# Patient Record
Sex: MALE | Race: WHITE | NOT HISPANIC OR LATINO | Employment: OTHER | URBAN - NONMETROPOLITAN AREA
[De-identification: names, ages, dates, MRNs, and addresses within clinical notes are randomized per-mention and may not be internally consistent; named-entity substitution may affect disease eponyms.]

---

## 2017-01-01 ENCOUNTER — HOSPITAL ENCOUNTER (OUTPATIENT)
Facility: HOSPITAL | Age: 72
Setting detail: HOSPITAL OUTPATIENT SURGERY
Discharge: HOME | End: 2017-12-05
Attending: SURGERY | Admitting: SURGERY

## 2017-01-01 ENCOUNTER — HOSPITAL ENCOUNTER (OUTPATIENT)
Facility: HOSPITAL | Age: 72
Setting detail: HOSPITAL OUTPATIENT SURGERY
Discharge: HOME | End: 2017-07-07
Attending: UROLOGY | Admitting: UROLOGY

## 2017-01-01 ENCOUNTER — LAB (OUTPATIENT)
Dept: LAB | Facility: OTHER | Age: 72
End: 2017-01-01

## 2017-01-01 ENCOUNTER — CLINICAL SUPPORT (OUTPATIENT)
Dept: FAMILY MEDICINE CLINIC | Facility: CLINIC | Age: 72
End: 2017-01-01

## 2017-01-01 ENCOUNTER — ANESTHESIA EVENT (OUTPATIENT)
Dept: PERIOP | Facility: HOSPITAL | Age: 72
End: 2017-01-01

## 2017-01-01 ENCOUNTER — PREP FOR SURGERY (OUTPATIENT)
Dept: OTHER | Facility: HOSPITAL | Age: 72
End: 2017-01-01

## 2017-01-01 ENCOUNTER — TELEPHONE (OUTPATIENT)
Dept: FAMILY MEDICINE CLINIC | Facility: CLINIC | Age: 72
End: 2017-01-01

## 2017-01-01 ENCOUNTER — APPOINTMENT (OUTPATIENT)
Dept: PREADMISSION TESTING | Facility: HOSPITAL | Age: 72
End: 2017-01-01

## 2017-01-01 ENCOUNTER — ANESTHESIA (OUTPATIENT)
Dept: PERIOP | Facility: HOSPITAL | Age: 72
End: 2017-01-01

## 2017-01-01 ENCOUNTER — ANESTHESIA (OUTPATIENT)
Dept: GASTROENTEROLOGY | Facility: HOSPITAL | Age: 72
End: 2017-01-01

## 2017-01-01 ENCOUNTER — HOSPITAL ENCOUNTER (OUTPATIENT)
Facility: HOSPITAL | Age: 72
Setting detail: HOSPITAL OUTPATIENT SURGERY
Discharge: HOME | End: 2017-09-15
Attending: UROLOGY | Admitting: UROLOGY

## 2017-01-01 ENCOUNTER — OFFICE VISIT (OUTPATIENT)
Dept: FAMILY MEDICINE CLINIC | Facility: CLINIC | Age: 72
End: 2017-01-01

## 2017-01-01 ENCOUNTER — OFFICE VISIT (OUTPATIENT)
Dept: SURGERY | Facility: CLINIC | Age: 72
End: 2017-01-01

## 2017-01-01 ENCOUNTER — APPOINTMENT (OUTPATIENT)
Dept: GENERAL RADIOLOGY | Facility: HOSPITAL | Age: 72
End: 2017-01-01

## 2017-01-01 ENCOUNTER — ANESTHESIA EVENT (OUTPATIENT)
Dept: GASTROENTEROLOGY | Facility: HOSPITAL | Age: 72
End: 2017-01-01

## 2017-01-01 ENCOUNTER — HOSPITAL ENCOUNTER (OUTPATIENT)
Dept: GENERAL RADIOLOGY | Facility: HOSPITAL | Age: 72
Discharge: HOME | End: 2017-06-30

## 2017-01-01 ENCOUNTER — HOSPITAL ENCOUNTER (OUTPATIENT)
Facility: HOSPITAL | Age: 72
Setting detail: HOSPITAL OUTPATIENT SURGERY
Discharge: HOME | End: 2017-06-30
Attending: UROLOGY | Admitting: UROLOGY

## 2017-01-01 VITALS — WEIGHT: 210 LBS | BODY MASS INDEX: 28.44 KG/M2 | HEIGHT: 72 IN

## 2017-01-01 VITALS
SYSTOLIC BLOOD PRESSURE: 132 MMHG | RESPIRATION RATE: 18 BRPM | OXYGEN SATURATION: 97 % | TEMPERATURE: 97.1 F | WEIGHT: 209 LBS | BODY MASS INDEX: 28.31 KG/M2 | HEIGHT: 72 IN | HEART RATE: 57 BPM | DIASTOLIC BLOOD PRESSURE: 70 MMHG

## 2017-01-01 VITALS
SYSTOLIC BLOOD PRESSURE: 114 MMHG | OXYGEN SATURATION: 91 % | WEIGHT: 208 LBS | DIASTOLIC BLOOD PRESSURE: 62 MMHG | BODY MASS INDEX: 28.17 KG/M2 | HEART RATE: 78 BPM | HEIGHT: 72 IN

## 2017-01-01 VITALS
SYSTOLIC BLOOD PRESSURE: 114 MMHG | OXYGEN SATURATION: 97 % | RESPIRATION RATE: 16 BRPM | DIASTOLIC BLOOD PRESSURE: 66 MMHG | HEART RATE: 69 BPM | WEIGHT: 210 LBS | HEIGHT: 72 IN | BODY MASS INDEX: 28.44 KG/M2

## 2017-01-01 VITALS
DIASTOLIC BLOOD PRESSURE: 78 MMHG | HEIGHT: 72 IN | BODY MASS INDEX: 27.9 KG/M2 | SYSTOLIC BLOOD PRESSURE: 112 MMHG | WEIGHT: 206 LBS

## 2017-01-01 VITALS
DIASTOLIC BLOOD PRESSURE: 66 MMHG | BODY MASS INDEX: 28.22 KG/M2 | WEIGHT: 208.34 LBS | SYSTOLIC BLOOD PRESSURE: 118 MMHG | HEART RATE: 65 BPM | TEMPERATURE: 96.7 F | OXYGEN SATURATION: 95 % | HEIGHT: 72 IN | RESPIRATION RATE: 18 BRPM

## 2017-01-01 VITALS
OXYGEN SATURATION: 92 % | HEIGHT: 72 IN | TEMPERATURE: 97.2 F | HEART RATE: 57 BPM | WEIGHT: 205.69 LBS | DIASTOLIC BLOOD PRESSURE: 65 MMHG | RESPIRATION RATE: 18 BRPM | BODY MASS INDEX: 27.86 KG/M2 | SYSTOLIC BLOOD PRESSURE: 136 MMHG

## 2017-01-01 VITALS
BODY MASS INDEX: 27.9 KG/M2 | DIASTOLIC BLOOD PRESSURE: 67 MMHG | HEIGHT: 72 IN | TEMPERATURE: 96.4 F | RESPIRATION RATE: 18 BRPM | SYSTOLIC BLOOD PRESSURE: 118 MMHG | OXYGEN SATURATION: 98 % | WEIGHT: 206 LBS | HEART RATE: 55 BPM

## 2017-01-01 VITALS
DIASTOLIC BLOOD PRESSURE: 70 MMHG | HEIGHT: 72 IN | SYSTOLIC BLOOD PRESSURE: 132 MMHG | BODY MASS INDEX: 28.44 KG/M2 | HEART RATE: 74 BPM | WEIGHT: 210 LBS | RESPIRATION RATE: 18 BRPM | TEMPERATURE: 98.3 F | OXYGEN SATURATION: 97 %

## 2017-01-01 VITALS
BODY MASS INDEX: 27.36 KG/M2 | SYSTOLIC BLOOD PRESSURE: 114 MMHG | HEIGHT: 72 IN | DIASTOLIC BLOOD PRESSURE: 70 MMHG | WEIGHT: 202 LBS

## 2017-01-01 DIAGNOSIS — I10 ESSENTIAL HYPERTENSION: Primary | ICD-10-CM

## 2017-01-01 DIAGNOSIS — N20.0 CALCULUS OF KIDNEY: ICD-10-CM

## 2017-01-01 DIAGNOSIS — E53.8 B12 DEFICIENCY: ICD-10-CM

## 2017-01-01 DIAGNOSIS — D51.0 PERNICIOUS ANEMIA: Primary | ICD-10-CM

## 2017-01-01 DIAGNOSIS — R63.4 UNEXPLAINED WEIGHT LOSS: ICD-10-CM

## 2017-01-01 DIAGNOSIS — I10 BENIGN ESSENTIAL HTN: ICD-10-CM

## 2017-01-01 DIAGNOSIS — N20.0 CALCULUS OF KIDNEY: Primary | ICD-10-CM

## 2017-01-01 DIAGNOSIS — J44.1 CHRONIC OBSTRUCTIVE PULMONARY DISEASE WITH ACUTE EXACERBATION (HCC): ICD-10-CM

## 2017-01-01 DIAGNOSIS — R31.9 HEMATURIA: Primary | ICD-10-CM

## 2017-01-01 DIAGNOSIS — Z23 NEED FOR INFLUENZA VACCINATION: Primary | ICD-10-CM

## 2017-01-01 DIAGNOSIS — H10.13 ALLERGIC CONJUNCTIVITIS, BILATERAL: ICD-10-CM

## 2017-01-01 DIAGNOSIS — R31.9 HEMATURIA: ICD-10-CM

## 2017-01-01 DIAGNOSIS — R05.9 COUGH: ICD-10-CM

## 2017-01-01 DIAGNOSIS — R91.8 BILATERAL PULMONARY INFILTRATES ON CXR: ICD-10-CM

## 2017-01-01 DIAGNOSIS — J01.01 ACUTE RECURRENT MAXILLARY SINUSITIS: Primary | ICD-10-CM

## 2017-01-01 DIAGNOSIS — N20.1 CALCULUS OF URETER: Primary | ICD-10-CM

## 2017-01-01 DIAGNOSIS — E78.5 HYPERLIPIDEMIA, UNSPECIFIED HYPERLIPIDEMIA TYPE: ICD-10-CM

## 2017-01-01 DIAGNOSIS — J84.10 FIBROSIS OF LUNG (HCC): Primary | ICD-10-CM

## 2017-01-01 DIAGNOSIS — R73.9 HYPERGLYCEMIA: Primary | ICD-10-CM

## 2017-01-01 DIAGNOSIS — N20.1 CALCULI, URETER: ICD-10-CM

## 2017-01-01 DIAGNOSIS — D51.0 PERNICIOUS ANEMIA: ICD-10-CM

## 2017-01-01 DIAGNOSIS — D12.3 ADENOMATOUS POLYP OF TRANSVERSE COLON: Primary | ICD-10-CM

## 2017-01-01 DIAGNOSIS — J84.10 FIBROSIS OF LUNG (HCC): ICD-10-CM

## 2017-01-01 DIAGNOSIS — H66.92 LEFT OTITIS MEDIA, UNSPECIFIED CHRONICITY, UNSPECIFIED OTITIS MEDIA TYPE: ICD-10-CM

## 2017-01-01 DIAGNOSIS — K63.5 DYSPLASTIC COLON POLYP: ICD-10-CM

## 2017-01-01 DIAGNOSIS — J30.1 SEASONAL ALLERGIC RHINITIS DUE TO POLLEN: ICD-10-CM

## 2017-01-01 DIAGNOSIS — K63.5 DYSPLASTIC COLON POLYP: Primary | ICD-10-CM

## 2017-01-01 DIAGNOSIS — N20.0 KIDNEY STONE: Primary | ICD-10-CM

## 2017-01-01 DIAGNOSIS — I10 ESSENTIAL HYPERTENSION: ICD-10-CM

## 2017-01-01 DIAGNOSIS — Z12.5 SCREENING PSA (PROSTATE SPECIFIC ANTIGEN): ICD-10-CM

## 2017-01-01 DIAGNOSIS — N20.1 CALCULI, URETER: Primary | ICD-10-CM

## 2017-01-01 DIAGNOSIS — R05.9 COUGH: Primary | ICD-10-CM

## 2017-01-01 DIAGNOSIS — R73.9 HYPERGLYCEMIA: ICD-10-CM

## 2017-01-01 DIAGNOSIS — E04.1 THYROID NODULE: ICD-10-CM

## 2017-01-01 LAB
ALBUMIN SERPL-MCNC: 3.8 G/DL (ref 3.2–5.5)
ALBUMIN/GLOB SERPL: 1 G/DL (ref 1–3)
ALP SERPL-CCNC: 86 U/L (ref 15–121)
ALT SERPL W P-5'-P-CCNC: 15 U/L (ref 10–60)
ANION GAP SERPL CALCULATED.3IONS-SCNC: 11 MMOL/L (ref 5–15)
AST SERPL-CCNC: 20 U/L (ref 10–60)
BACTERIA SPEC AEROBE CULT: NORMAL
BACTERIA UR QL AUTO: ABNORMAL /HPF
BASOPHILS # BLD AUTO: 0.03 10*3/MM3 (ref 0–0.2)
BASOPHILS # BLD AUTO: 0.05 10*3/MM3 (ref 0–0.2)
BASOPHILS NFR BLD AUTO: 0.4 % (ref 0–2)
BASOPHILS NFR BLD AUTO: 0.7 % (ref 0–2)
BILIRUB SERPL-MCNC: 1 MG/DL (ref 0.2–1)
BILIRUB UR QL STRIP: ABNORMAL
BILIRUB UR QL STRIP: ABNORMAL
BILIRUB UR QL STRIP: NEGATIVE
BUN BLD-MCNC: 15 MG/DL (ref 8–25)
BUN/CREAT SERPL: 13.6 (ref 7–25)
CALCIUM SPEC-SCNC: 9 MG/DL (ref 8.4–10.8)
CHLORIDE SERPL-SCNC: 103 MMOL/L (ref 100–112)
CHOLEST SERPL-MCNC: 158 MG/DL (ref 150–200)
CLARITY UR: ABNORMAL
CLARITY UR: ABNORMAL
CLARITY UR: CLEAR
CO2 SERPL-SCNC: 26 MMOL/L (ref 20–32)
COLOR UR: ABNORMAL
COLOR UR: ABNORMAL
COLOR UR: YELLOW
CREAT BLD-MCNC: 1.1 MG/DL (ref 0.4–1.3)
DEPRECATED RDW RBC AUTO: 43.8 FL (ref 35.1–43.9)
DEPRECATED RDW RBC AUTO: 45.9 FL (ref 35.1–43.9)
EOSINOPHIL # BLD AUTO: 0.14 10*3/MM3 (ref 0–0.7)
EOSINOPHIL # BLD AUTO: 0.18 10*3/MM3 (ref 0–0.7)
EOSINOPHIL NFR BLD AUTO: 2 % (ref 0–7)
EOSINOPHIL NFR BLD AUTO: 2.6 % (ref 0–7)
ERYTHROCYTE [DISTWIDTH] IN BLOOD BY AUTOMATED COUNT: 12.9 % (ref 11.5–14.5)
ERYTHROCYTE [DISTWIDTH] IN BLOOD BY AUTOMATED COUNT: 13.7 % (ref 11.5–14.5)
GFR SERPL CREATININE-BSD FRML MDRD: 66 ML/MIN/1.73 (ref 42–98)
GLOBULIN UR ELPH-MCNC: 3.7 GM/DL (ref 2.5–4.6)
GLUCOSE BLD-MCNC: 130 MG/DL (ref 70–100)
GLUCOSE UR STRIP-MCNC: NEGATIVE MG/DL
HCT VFR BLD AUTO: 39.7 % (ref 39–49)
HCT VFR BLD AUTO: 40.8 % (ref 39–49)
HDLC SERPL-MCNC: 37 MG/DL (ref 35–100)
HGB BLD-MCNC: 13.4 G/DL (ref 13.7–17.3)
HGB BLD-MCNC: 13.7 G/DL (ref 13.7–17.3)
HGB UR QL STRIP.AUTO: ABNORMAL
HGB UR QL STRIP.AUTO: ABNORMAL
HGB UR QL STRIP.AUTO: NEGATIVE
HYALINE CASTS UR QL AUTO: ABNORMAL /LPF
KETONES UR QL STRIP: ABNORMAL
KETONES UR QL STRIP: NEGATIVE
KETONES UR QL STRIP: NEGATIVE
LAB AP CASE REPORT: NORMAL
LAB AP CASE REPORT: NORMAL
LDLC SERPL CALC-MCNC: 94 MG/DL
LDLC/HDLC SERPL: 2.54 {RATIO}
LEUKOCYTE ESTERASE UR QL STRIP.AUTO: ABNORMAL
LEUKOCYTE ESTERASE UR QL STRIP.AUTO: NEGATIVE
LEUKOCYTE ESTERASE UR QL STRIP.AUTO: NEGATIVE
LYMPHOCYTES # BLD AUTO: 1.2 10*3/MM3 (ref 0.6–4.2)
LYMPHOCYTES # BLD AUTO: 1.33 10*3/MM3 (ref 0.6–4.2)
LYMPHOCYTES NFR BLD AUTO: 17.1 % (ref 10–50)
LYMPHOCYTES NFR BLD AUTO: 19.3 % (ref 10–50)
Lab: NORMAL
Lab: NORMAL
MCH RBC QN AUTO: 32.2 PG (ref 26.5–34)
MCH RBC QN AUTO: 32.3 PG (ref 26.5–34)
MCHC RBC AUTO-ENTMCNC: 33.6 G/DL (ref 31.5–36.3)
MCHC RBC AUTO-ENTMCNC: 33.8 G/DL (ref 31.5–36.3)
MCV RBC AUTO: 95.7 FL (ref 80–98)
MCV RBC AUTO: 96 FL (ref 80–98)
MONOCYTES # BLD AUTO: 0.61 10*3/MM3 (ref 0–0.9)
MONOCYTES # BLD AUTO: 0.63 10*3/MM3 (ref 0–0.9)
MONOCYTES NFR BLD AUTO: 8.7 % (ref 0–12)
MONOCYTES NFR BLD AUTO: 9.1 % (ref 0–12)
NEUTROPHILS # BLD AUTO: 4.74 10*3/MM3 (ref 2–8.6)
NEUTROPHILS # BLD AUTO: 5 10*3/MM3 (ref 2–8.6)
NEUTROPHILS NFR BLD AUTO: 68.9 % (ref 37–80)
NEUTROPHILS NFR BLD AUTO: 71.2 % (ref 37–80)
NITRITE UR QL STRIP: NEGATIVE
NITRITE UR QL STRIP: POSITIVE
NITRITE UR QL STRIP: POSITIVE
PATH REPORT.FINAL DX SPEC: NORMAL
PATH REPORT.FINAL DX SPEC: NORMAL
PATH REPORT.GROSS SPEC: NORMAL
PATH REPORT.GROSS SPEC: NORMAL
PH UR STRIP.AUTO: 5.5 [PH] (ref 5–9)
PH UR STRIP.AUTO: 6 [PH] (ref 5–9)
PH UR STRIP.AUTO: 7 [PH] (ref 5.5–8)
PLATELET # BLD AUTO: 215 10*3/MM3 (ref 150–450)
PLATELET # BLD AUTO: 217 10*3/MM3 (ref 150–450)
PMV BLD AUTO: 9.6 FL (ref 8–12)
PMV BLD AUTO: 9.8 FL (ref 8–12)
POTASSIUM BLD-SCNC: 3.8 MMOL/L (ref 3.4–5.4)
PROT SERPL-MCNC: 7.5 G/DL (ref 6.7–8.2)
PROT UR QL STRIP: ABNORMAL
PROT UR QL STRIP: ABNORMAL
PROT UR QL STRIP: NEGATIVE
RBC # BLD AUTO: 4.15 10*6/MM3 (ref 4.37–5.74)
RBC # BLD AUTO: 4.25 10*6/MM3 (ref 4.37–5.74)
RBC # UR: ABNORMAL /HPF
REF LAB TEST METHOD: ABNORMAL
SODIUM BLD-SCNC: 140 MMOL/L (ref 134–146)
SP GR UR STRIP: 1.02 (ref 1–1.03)
SQUAMOUS #/AREA URNS HPF: ABNORMAL /HPF
T4 FREE SERPL-MCNC: 1.68 NG/DL (ref 0.78–2.19)
TRIGL SERPL-MCNC: 135 MG/DL (ref 35–160)
TSH SERPL DL<=0.05 MIU/L-ACNC: 0.34 MIU/ML (ref 0.46–4.68)
UROBILINOGEN UR QL STRIP: ABNORMAL
UROBILINOGEN UR QL STRIP: ABNORMAL
UROBILINOGEN UR QL STRIP: NORMAL
VLDLC SERPL-MCNC: 27 MG/DL
WBC NRBC COR # BLD: 6.89 10*3/MM3 (ref 3.2–9.8)
WBC NRBC COR # BLD: 7.02 10*3/MM3 (ref 3.2–9.8)
WBC UR QL AUTO: ABNORMAL /HPF

## 2017-01-01 PROCEDURE — 96372 THER/PROPH/DIAG INJ SC/IM: CPT | Performed by: FAMILY MEDICINE

## 2017-01-01 PROCEDURE — C1769 GUIDE WIRE: HCPCS | Performed by: UROLOGY

## 2017-01-01 PROCEDURE — 25010000002 PROPOFOL 10 MG/ML EMULSION: Performed by: NURSE ANESTHETIST, CERTIFIED REGISTERED

## 2017-01-01 PROCEDURE — 74420 UROGRAPHY RTRGR +-KUB: CPT

## 2017-01-01 PROCEDURE — 25010000002 MIDAZOLAM PER 1 MG: Performed by: NURSE ANESTHETIST, CERTIFIED REGISTERED

## 2017-01-01 PROCEDURE — C2617 STENT, NON-COR, TEM W/O DEL: HCPCS | Performed by: UROLOGY

## 2017-01-01 PROCEDURE — C1887 CATHETER, GUIDING: HCPCS | Performed by: UROLOGY

## 2017-01-01 PROCEDURE — 25010000002 LEVOFLOXACIN PER 250 MG: Performed by: UROLOGY

## 2017-01-01 PROCEDURE — 25010000002 HYDROMORPHONE PER 4 MG: Performed by: ANESTHESIOLOGY

## 2017-01-01 PROCEDURE — 0 IOPAMIDOL 61 % SOLUTION: Performed by: UROLOGY

## 2017-01-01 PROCEDURE — 80053 COMPREHEN METABOLIC PANEL: CPT | Performed by: FAMILY MEDICINE

## 2017-01-01 PROCEDURE — 85025 COMPLETE CBC W/AUTO DIFF WBC: CPT | Performed by: FAMILY MEDICINE

## 2017-01-01 PROCEDURE — C1894 INTRO/SHEATH, NON-LASER: HCPCS | Performed by: UROLOGY

## 2017-01-01 PROCEDURE — 81003 URINALYSIS AUTO W/O SCOPE: CPT | Performed by: UROLOGY

## 2017-01-01 PROCEDURE — C1758 CATHETER, URETERAL: HCPCS | Performed by: UROLOGY

## 2017-01-01 PROCEDURE — 45380 COLONOSCOPY AND BIOPSY: CPT | Performed by: SURGERY

## 2017-01-01 PROCEDURE — 88300 SURGICAL PATH GROSS: CPT | Performed by: UROLOGY

## 2017-01-01 PROCEDURE — 93005 ELECTROCARDIOGRAM TRACING: CPT | Performed by: ANESTHESIOLOGY

## 2017-01-01 PROCEDURE — 99214 OFFICE O/P EST MOD 30 MIN: CPT | Performed by: NURSE PRACTITIONER

## 2017-01-01 PROCEDURE — 84443 ASSAY THYROID STIM HORMONE: CPT | Performed by: FAMILY MEDICINE

## 2017-01-01 PROCEDURE — 80061 LIPID PANEL: CPT | Performed by: FAMILY MEDICINE

## 2017-01-01 PROCEDURE — 99213 OFFICE O/P EST LOW 20 MIN: CPT | Performed by: SURGERY

## 2017-01-01 PROCEDURE — 88300 SURGICAL PATH GROSS: CPT | Performed by: PATHOLOGY

## 2017-01-01 PROCEDURE — 81001 URINALYSIS AUTO W/SCOPE: CPT | Performed by: FAMILY MEDICINE

## 2017-01-01 PROCEDURE — 94640 AIRWAY INHALATION TREATMENT: CPT

## 2017-01-01 PROCEDURE — 25010000002 ONDANSETRON PER 1 MG: Performed by: NURSE ANESTHETIST, CERTIFIED REGISTERED

## 2017-01-01 PROCEDURE — 88305 TISSUE EXAM BY PATHOLOGIST: CPT | Performed by: PATHOLOGY

## 2017-01-01 PROCEDURE — 90662 IIV NO PRSV INCREASED AG IM: CPT | Performed by: FAMILY MEDICINE

## 2017-01-01 PROCEDURE — 99212 OFFICE O/P EST SF 10 MIN: CPT | Performed by: SURGERY

## 2017-01-01 PROCEDURE — 87086 URINE CULTURE/COLONY COUNT: CPT | Performed by: FAMILY MEDICINE

## 2017-01-01 PROCEDURE — 84439 ASSAY OF FREE THYROXINE: CPT | Performed by: FAMILY MEDICINE

## 2017-01-01 PROCEDURE — 99214 OFFICE O/P EST MOD 30 MIN: CPT | Performed by: FAMILY MEDICINE

## 2017-01-01 PROCEDURE — 36415 COLL VENOUS BLD VENIPUNCTURE: CPT | Performed by: FAMILY MEDICINE

## 2017-01-01 PROCEDURE — G0008 ADMIN INFLUENZA VIRUS VAC: HCPCS | Performed by: FAMILY MEDICINE

## 2017-01-01 PROCEDURE — 93010 ELECTROCARDIOGRAM REPORT: CPT | Performed by: INTERNAL MEDICINE

## 2017-01-01 PROCEDURE — 25010000002 DEXAMETHASONE PER 1 MG: Performed by: NURSE ANESTHETIST, CERTIFIED REGISTERED

## 2017-01-01 PROCEDURE — 88305 TISSUE EXAM BY PATHOLOGIST: CPT | Performed by: SURGERY

## 2017-01-01 PROCEDURE — 25010000002 FENTANYL CITRATE (PF) 100 MCG/2ML SOLUTION: Performed by: NURSE ANESTHETIST, CERTIFIED REGISTERED

## 2017-01-01 PROCEDURE — 45385 COLONOSCOPY W/LESION REMOVAL: CPT | Performed by: SURGERY

## 2017-01-01 DEVICE — URETERAL STENT
Type: IMPLANTABLE DEVICE | Status: FUNCTIONAL
Brand: PERCUFLEX™ PLUS

## 2017-01-01 DEVICE — URETERAL STENT
Type: IMPLANTABLE DEVICE | Site: URETER | Status: FUNCTIONAL
Brand: POLARIS™ ULTRA

## 2017-01-01 DEVICE — URETERAL STENT
Type: IMPLANTABLE DEVICE | Site: URETER | Status: FUNCTIONAL
Brand: PERCUFLEX™ PLUS

## 2017-01-01 RX ORDER — EPHEDRINE SULFATE 50 MG/ML
5 INJECTION, SOLUTION INTRAVENOUS ONCE AS NEEDED
Status: DISCONTINUED | OUTPATIENT
Start: 2017-01-01 | End: 2017-01-01 | Stop reason: HOSPADM

## 2017-01-01 RX ORDER — OXYCODONE AND ACETAMINOPHEN 7.5; 325 MG/1; MG/1
1 TABLET ORAL ONCE AS NEEDED
Status: COMPLETED | OUTPATIENT
Start: 2017-01-01 | End: 2017-01-01

## 2017-01-01 RX ORDER — TAMSULOSIN HYDROCHLORIDE 0.4 MG/1
1 CAPSULE ORAL NIGHTLY
Qty: 90 CAPSULE | Refills: 1 | Status: SHIPPED | OUTPATIENT
Start: 2017-01-01

## 2017-01-01 RX ORDER — MIDAZOLAM HYDROCHLORIDE 1 MG/ML
INJECTION INTRAMUSCULAR; INTRAVENOUS AS NEEDED
Status: DISCONTINUED | OUTPATIENT
Start: 2017-01-01 | End: 2017-01-01 | Stop reason: SURG

## 2017-01-01 RX ORDER — DOXYCYCLINE HYCLATE 100 MG/1
100 CAPSULE ORAL DAILY
Qty: 7 CAPSULE | Refills: 0 | Status: SHIPPED | OUTPATIENT
Start: 2017-01-01 | End: 2017-01-01

## 2017-01-01 RX ORDER — ONDANSETRON 2 MG/ML
4 INJECTION INTRAMUSCULAR; INTRAVENOUS ONCE AS NEEDED
Status: DISCONTINUED | OUTPATIENT
Start: 2017-01-01 | End: 2017-01-01 | Stop reason: HOSPADM

## 2017-01-01 RX ORDER — OXYCODONE AND ACETAMINOPHEN 7.5; 325 MG/1; MG/1
1-2 TABLET ORAL EVERY 4 HOURS PRN
Qty: 15 TABLET | Refills: 0 | Status: SHIPPED | OUTPATIENT
Start: 2017-01-01 | End: 2017-01-01

## 2017-01-01 RX ORDER — CEPHALEXIN 250 MG/1
250 CAPSULE ORAL 4 TIMES DAILY
Qty: 10 CAPSULE | Refills: 0 | Status: SHIPPED | OUTPATIENT
Start: 2017-01-01 | End: 2017-01-01

## 2017-01-01 RX ORDER — FLUMAZENIL 0.1 MG/ML
0.2 INJECTION INTRAVENOUS AS NEEDED
Status: DISCONTINUED | OUTPATIENT
Start: 2017-01-01 | End: 2017-01-01 | Stop reason: HOSPADM

## 2017-01-01 RX ORDER — PROPOFOL 10 MG/ML
VIAL (ML) INTRAVENOUS AS NEEDED
Status: DISCONTINUED | OUTPATIENT
Start: 2017-01-01 | End: 2017-01-01 | Stop reason: SURG

## 2017-01-01 RX ORDER — ACETAMINOPHEN 650 MG/1
650 SUPPOSITORY RECTAL ONCE AS NEEDED
Status: DISCONTINUED | OUTPATIENT
Start: 2017-01-01 | End: 2017-01-01 | Stop reason: HOSPADM

## 2017-01-01 RX ORDER — OMEPRAZOLE 20 MG/1
40 CAPSULE, DELAYED RELEASE ORAL DAILY
COMMUNITY

## 2017-01-01 RX ORDER — FENTANYL CITRATE 50 UG/ML
INJECTION, SOLUTION INTRAMUSCULAR; INTRAVENOUS AS NEEDED
Status: DISCONTINUED | OUTPATIENT
Start: 2017-01-01 | End: 2017-01-01 | Stop reason: SURG

## 2017-01-01 RX ORDER — LEVOFLOXACIN 5 MG/ML
500 INJECTION, SOLUTION INTRAVENOUS ONCE
Status: COMPLETED | OUTPATIENT
Start: 2017-01-01 | End: 2017-01-01

## 2017-01-01 RX ORDER — OMEPRAZOLE 20 MG/1
CAPSULE, DELAYED RELEASE ORAL
Qty: 180 CAPSULE | Refills: 2 | Status: ON HOLD | OUTPATIENT
Start: 2017-01-01 | End: 2017-01-01 | Stop reason: SDUPTHER

## 2017-01-01 RX ORDER — TAMSULOSIN HYDROCHLORIDE 0.4 MG/1
1 CAPSULE ORAL NIGHTLY
COMMUNITY
End: 2017-01-01 | Stop reason: SDUPTHER

## 2017-01-01 RX ORDER — GUAIFENESIN 600 MG/1
1200 TABLET, EXTENDED RELEASE ORAL 2 TIMES DAILY
Qty: 10 TABLET | Refills: 0 | Status: ON HOLD | OUTPATIENT
Start: 2017-01-01 | End: 2017-01-01

## 2017-01-01 RX ORDER — ONDANSETRON 2 MG/ML
4 INJECTION INTRAMUSCULAR; INTRAVENOUS ONCE AS NEEDED
Status: COMPLETED | OUTPATIENT
Start: 2017-01-01 | End: 2017-01-01

## 2017-01-01 RX ORDER — SODIUM CHLORIDE, SODIUM GLUCONATE, SODIUM ACETATE, POTASSIUM CHLORIDE, AND MAGNESIUM CHLORIDE 526; 502; 368; 37; 30 MG/100ML; MG/100ML; MG/100ML; MG/100ML; MG/100ML
1000 INJECTION, SOLUTION INTRAVENOUS CONTINUOUS PRN
Status: CANCELLED | OUTPATIENT
Start: 2017-01-01

## 2017-01-01 RX ORDER — PROMETHAZINE HYDROCHLORIDE 25 MG/1
25 TABLET ORAL ONCE AS NEEDED
Status: DISCONTINUED | OUTPATIENT
Start: 2017-01-01 | End: 2017-01-01 | Stop reason: HOSPADM

## 2017-01-01 RX ORDER — LIDOCAINE HYDROCHLORIDE 20 MG/ML
INJECTION, SOLUTION INFILTRATION; PERINEURAL AS NEEDED
Status: DISCONTINUED | OUTPATIENT
Start: 2017-01-01 | End: 2017-01-01 | Stop reason: SURG

## 2017-01-01 RX ORDER — DOXYCYCLINE HYCLATE 100 MG/1
100 CAPSULE ORAL 2 TIMES DAILY
Qty: 14 CAPSULE | Refills: 0 | Status: SHIPPED | OUTPATIENT
Start: 2017-01-01 | End: 2017-01-01

## 2017-01-01 RX ORDER — ALBUTEROL SULFATE 2.5 MG/3ML
2.5 SOLUTION RESPIRATORY (INHALATION) EVERY 4 HOURS PRN
Qty: 360 ML | Refills: 12 | Status: SHIPPED | OUTPATIENT
Start: 2017-01-01

## 2017-01-01 RX ORDER — DIPHENHYDRAMINE HYDROCHLORIDE 50 MG/ML
12.5 INJECTION INTRAMUSCULAR; INTRAVENOUS
Status: DISCONTINUED | OUTPATIENT
Start: 2017-01-01 | End: 2017-01-01 | Stop reason: HOSPADM

## 2017-01-01 RX ORDER — GUAIFENESIN AND DEXTROMETHORPHAN HYDROBROMIDE 600; 30 MG/1; MG/1
1 TABLET, EXTENDED RELEASE ORAL 2 TIMES DAILY PRN
Qty: 30 TABLET | Refills: 5 | Status: SHIPPED | OUTPATIENT
Start: 2017-01-01 | End: 2017-01-01

## 2017-01-01 RX ORDER — FLUTICASONE PROPIONATE 50 MCG
2 SPRAY, SUSPENSION (ML) NASAL DAILY
Qty: 9.9 ML | Refills: 0 | Status: SHIPPED | OUTPATIENT
Start: 2017-01-01

## 2017-01-01 RX ORDER — METOPROLOL SUCCINATE 50 MG/1
TABLET, EXTENDED RELEASE ORAL
Qty: 90 TABLET | Refills: 1 | Status: SHIPPED | OUTPATIENT
Start: 2017-01-01 | End: 2017-01-01 | Stop reason: SDUPTHER

## 2017-01-01 RX ORDER — LABETALOL HYDROCHLORIDE 5 MG/ML
5 INJECTION, SOLUTION INTRAVENOUS
Status: DISCONTINUED | OUTPATIENT
Start: 2017-01-01 | End: 2017-01-01 | Stop reason: HOSPADM

## 2017-01-01 RX ORDER — DEXAMETHASONE SODIUM PHOSPHATE 4 MG/ML
INJECTION, SOLUTION INTRA-ARTICULAR; INTRALESIONAL; INTRAMUSCULAR; INTRAVENOUS; SOFT TISSUE AS NEEDED
Status: DISCONTINUED | OUTPATIENT
Start: 2017-01-01 | End: 2017-01-01 | Stop reason: SURG

## 2017-01-01 RX ORDER — SODIUM CHLORIDE, SODIUM GLUCONATE, SODIUM ACETATE, POTASSIUM CHLORIDE, AND MAGNESIUM CHLORIDE 526; 502; 368; 37; 30 MG/100ML; MG/100ML; MG/100ML; MG/100ML; MG/100ML
1000 INJECTION, SOLUTION INTRAVENOUS CONTINUOUS PRN
Status: DISCONTINUED | OUTPATIENT
Start: 2017-01-01 | End: 2017-01-01 | Stop reason: HOSPADM

## 2017-01-01 RX ORDER — LEVOFLOXACIN 5 MG/ML
500 INJECTION, SOLUTION INTRAVENOUS ONCE
Status: CANCELLED | OUTPATIENT
Start: 2017-01-01

## 2017-01-01 RX ORDER — DEXTROSE AND SODIUM CHLORIDE 5; .45 G/100ML; G/100ML
30 INJECTION, SOLUTION INTRAVENOUS CONTINUOUS PRN
Status: CANCELLED | OUTPATIENT
Start: 2017-01-01

## 2017-01-01 RX ORDER — DEXAMETHASONE SODIUM PHOSPHATE 4 MG/ML
8 INJECTION, SOLUTION INTRA-ARTICULAR; INTRALESIONAL; INTRAMUSCULAR; INTRAVENOUS; SOFT TISSUE ONCE AS NEEDED
Status: DISCONTINUED | OUTPATIENT
Start: 2017-01-01 | End: 2017-01-01 | Stop reason: HOSPADM

## 2017-01-01 RX ORDER — PROMETHAZINE HYDROCHLORIDE 25 MG/ML
12.5 INJECTION, SOLUTION INTRAMUSCULAR; INTRAVENOUS ONCE AS NEEDED
Status: DISCONTINUED | OUTPATIENT
Start: 2017-01-01 | End: 2017-01-01 | Stop reason: HOSPADM

## 2017-01-01 RX ORDER — ACETAMINOPHEN 325 MG/1
650 TABLET ORAL ONCE AS NEEDED
Status: DISCONTINUED | OUTPATIENT
Start: 2017-01-01 | End: 2017-01-01 | Stop reason: HOSPADM

## 2017-01-01 RX ORDER — LEVOFLOXACIN 5 MG/ML
500 INJECTION, SOLUTION INTRAVENOUS ONCE
Status: DISCONTINUED | OUTPATIENT
Start: 2017-01-01 | End: 2017-01-01 | Stop reason: HOSPADM

## 2017-01-01 RX ORDER — PROMETHAZINE HYDROCHLORIDE 25 MG/1
25 SUPPOSITORY RECTAL ONCE AS NEEDED
Status: DISCONTINUED | OUTPATIENT
Start: 2017-01-01 | End: 2017-01-01 | Stop reason: HOSPADM

## 2017-01-01 RX ORDER — LEVALBUTEROL INHALATION SOLUTION 1.25 MG/3ML
SOLUTION RESPIRATORY (INHALATION)
COMMUNITY
Start: 2017-01-01

## 2017-01-01 RX ORDER — OMEPRAZOLE 20 MG/1
CAPSULE, DELAYED RELEASE ORAL
Qty: 180 CAPSULE | Refills: 1 | Status: SHIPPED | OUTPATIENT
Start: 2017-01-01 | End: 2017-01-01 | Stop reason: SDUPTHER

## 2017-01-01 RX ORDER — AMLODIPINE BESYLATE 10 MG/1
10 TABLET ORAL DAILY
COMMUNITY

## 2017-01-01 RX ORDER — DEXTROSE AND SODIUM CHLORIDE 5; .45 G/100ML; G/100ML
30 INJECTION, SOLUTION INTRAVENOUS CONTINUOUS PRN
Status: DISCONTINUED | OUTPATIENT
Start: 2017-01-01 | End: 2017-01-01 | Stop reason: HOSPADM

## 2017-01-01 RX ORDER — AMOXICILLIN AND CLAVULANATE POTASSIUM 875; 125 MG/1; MG/1
1 TABLET, FILM COATED ORAL 2 TIMES DAILY
Qty: 20 TABLET | Refills: 0 | Status: CANCELLED | OUTPATIENT
Start: 2017-01-01 | End: 2017-01-01

## 2017-01-01 RX ORDER — METOPROLOL SUCCINATE 50 MG/1
TABLET, EXTENDED RELEASE ORAL
Qty: 90 TABLET | Refills: 2 | Status: ON HOLD | OUTPATIENT
Start: 2017-01-01 | End: 2017-01-01 | Stop reason: SDUPTHER

## 2017-01-01 RX ORDER — DEXTROMETHORPHAN HYDROBROMIDE AND PROMETHAZINE HYDROCHLORIDE 15; 6.25 MG/5ML; MG/5ML
5 SYRUP ORAL NIGHTLY PRN
Qty: 118 ML | Refills: 0 | Status: SHIPPED | OUTPATIENT
Start: 2017-01-01 | End: 2017-01-01

## 2017-01-01 RX ORDER — IPRATROPIUM BROMIDE 21 UG/1
1 SPRAY, METERED NASAL 2 TIMES DAILY
Status: ON HOLD | COMMUNITY
End: 2017-01-01

## 2017-01-01 RX ORDER — NALOXONE HCL 0.4 MG/ML
0.2 VIAL (ML) INJECTION AS NEEDED
Status: DISCONTINUED | OUTPATIENT
Start: 2017-01-01 | End: 2017-01-01 | Stop reason: HOSPADM

## 2017-01-01 RX ORDER — LIDOCAINE HYDROCHLORIDE 10 MG/ML
INJECTION, SOLUTION INFILTRATION; PERINEURAL AS NEEDED
Status: DISCONTINUED | OUTPATIENT
Start: 2017-01-01 | End: 2017-01-01 | Stop reason: SURG

## 2017-01-01 RX ORDER — CEFUROXIME AXETIL 500 MG/1
500 TABLET ORAL 2 TIMES DAILY
Qty: 20 TABLET | Refills: 0 | Status: SHIPPED | OUTPATIENT
Start: 2017-01-01 | End: 2017-01-01

## 2017-01-01 RX ORDER — METOPROLOL SUCCINATE 50 MG/1
50 TABLET, EXTENDED RELEASE ORAL DAILY
COMMUNITY

## 2017-01-01 RX ORDER — TRAMADOL HYDROCHLORIDE 50 MG/1
50 TABLET ORAL EVERY 8 HOURS PRN
COMMUNITY
End: 2017-01-01

## 2017-01-01 RX ORDER — PRAVASTATIN SODIUM 40 MG
40 TABLET ORAL DAILY
Qty: 90 TABLET | Refills: 3 | Status: SHIPPED | OUTPATIENT
Start: 2017-01-01

## 2017-01-01 RX ORDER — ALBUTEROL SULFATE 2.5 MG/3ML
2.5 SOLUTION RESPIRATORY (INHALATION) ONCE
Status: COMPLETED | OUTPATIENT
Start: 2017-01-01 | End: 2017-01-01

## 2017-01-01 RX ORDER — HYDROMORPHONE HCL 110MG/55ML
0.5 PATIENT CONTROLLED ANALGESIA SYRINGE INTRAVENOUS
Status: DISCONTINUED | OUTPATIENT
Start: 2017-01-01 | End: 2017-01-01 | Stop reason: HOSPADM

## 2017-01-01 RX ORDER — OLOPATADINE HYDROCHLORIDE 665 UG/1
1 SPRAY NASAL 2 TIMES DAILY
COMMUNITY
End: 2017-01-01

## 2017-01-01 RX ORDER — ONDANSETRON 2 MG/ML
INJECTION INTRAMUSCULAR; INTRAVENOUS AS NEEDED
Status: DISCONTINUED | OUTPATIENT
Start: 2017-01-01 | End: 2017-01-01 | Stop reason: SURG

## 2017-01-01 RX ORDER — OLOPATADINE HYDROCHLORIDE 1 MG/ML
1 SOLUTION/ DROPS OPHTHALMIC 2 TIMES DAILY
Qty: 5 ML | Refills: 12 | Status: SHIPPED | OUTPATIENT
Start: 2017-01-01 | End: 2017-01-01

## 2017-01-01 RX ORDER — KETAMINE HYDROCHLORIDE 100 MG/ML
INJECTION INTRAMUSCULAR; INTRAVENOUS AS NEEDED
Status: DISCONTINUED | OUTPATIENT
Start: 2017-01-01 | End: 2017-01-01 | Stop reason: SURG

## 2017-01-01 RX ADMIN — PROPOFOL 30 MG: 10 INJECTION, EMULSION INTRAVENOUS at 08:06

## 2017-01-01 RX ADMIN — OXYCODONE HYDROCHLORIDE AND ACETAMINOPHEN 1 TABLET: 7.5; 325 TABLET ORAL at 17:50

## 2017-01-01 RX ADMIN — KETAMINE HYDROCHLORIDE 30 MG: 100 INJECTION INTRAMUSCULAR; INTRAVENOUS at 08:13

## 2017-01-01 RX ADMIN — ALBUTEROL SULFATE 2.5 MG: 2.5 SOLUTION RESPIRATORY (INHALATION) at 14:41

## 2017-01-01 RX ADMIN — LEVOFLOXACIN 500 MG: 5 INJECTION, SOLUTION INTRAVENOUS at 16:45

## 2017-01-01 RX ADMIN — HYDROMORPHONE HYDROCHLORIDE 0.5 MG: 1 INJECTION, SOLUTION INTRAMUSCULAR; INTRAVENOUS; SUBCUTANEOUS at 17:17

## 2017-01-01 RX ADMIN — SODIUM CHLORIDE, SODIUM GLUCONATE, SODIUM ACETATE, POTASSIUM CHLORIDE, AND MAGNESIUM CHLORIDE 1000 ML: 526; 502; 368; 37; 30 INJECTION, SOLUTION INTRAVENOUS at 13:22

## 2017-01-01 RX ADMIN — CYANOCOBALAMIN 1000 MCG: 1000 INJECTION, SOLUTION INTRAMUSCULAR; SUBCUTANEOUS at 08:32

## 2017-01-01 RX ADMIN — LEVOFLOXACIN 500 MG: 5 INJECTION, SOLUTION INTRAVENOUS at 16:01

## 2017-01-01 RX ADMIN — PROPOFOL 20 MG: 10 INJECTION, EMULSION INTRAVENOUS at 08:12

## 2017-01-01 RX ADMIN — SODIUM CHLORIDE, SODIUM GLUCONATE, SODIUM ACETATE, POTASSIUM CHLORIDE, AND MAGNESIUM CHLORIDE 1000 ML: 526; 502; 368; 37; 30 INJECTION, SOLUTION INTRAVENOUS at 14:07

## 2017-01-01 RX ADMIN — CYANOCOBALAMIN 1000 MCG: 1000 INJECTION, SOLUTION INTRAMUSCULAR; SUBCUTANEOUS at 11:39

## 2017-01-01 RX ADMIN — FENTANYL CITRATE 25 MCG: 50 INJECTION, SOLUTION INTRAMUSCULAR; INTRAVENOUS at 16:26

## 2017-01-01 RX ADMIN — LIDOCAINE HYDROCHLORIDE 80 MG: 20 INJECTION, SOLUTION INFILTRATION; PERINEURAL at 15:56

## 2017-01-01 RX ADMIN — OXYCODONE HYDROCHLORIDE AND ACETAMINOPHEN 1 TABLET: 7.5; 325 TABLET ORAL at 20:32

## 2017-01-01 RX ADMIN — LIDOCAINE HYDROCHLORIDE 30 MG: 10 INJECTION, SOLUTION INFILTRATION; PERINEURAL at 08:03

## 2017-01-01 RX ADMIN — FENTANYL CITRATE 50 MCG: 50 INJECTION, SOLUTION INTRAMUSCULAR; INTRAVENOUS at 16:47

## 2017-01-01 RX ADMIN — PROPOFOL 140 MG: 10 INJECTION, EMULSION INTRAVENOUS at 16:02

## 2017-01-01 RX ADMIN — PROPOFOL 20 MG: 10 INJECTION, EMULSION INTRAVENOUS at 08:20

## 2017-01-01 RX ADMIN — MIDAZOLAM 2 MG: 1 INJECTION INTRAMUSCULAR; INTRAVENOUS at 15:53

## 2017-01-01 RX ADMIN — FENTANYL CITRATE 25 MCG: 50 INJECTION, SOLUTION INTRAMUSCULAR; INTRAVENOUS at 16:13

## 2017-01-01 RX ADMIN — PROPOFOL 20 MG: 10 INJECTION, EMULSION INTRAVENOUS at 08:24

## 2017-01-01 RX ADMIN — FENTANYL CITRATE 50 MCG: 50 INJECTION, SOLUTION INTRAMUSCULAR; INTRAVENOUS at 16:44

## 2017-01-01 RX ADMIN — HYDROMORPHONE HYDROCHLORIDE 0.5 MG: 1 INJECTION, SOLUTION INTRAMUSCULAR; INTRAVENOUS; SUBCUTANEOUS at 17:10

## 2017-01-01 RX ADMIN — SODIUM CHLORIDE, SODIUM GLUCONATE, SODIUM ACETATE, POTASSIUM CHLORIDE, AND MAGNESIUM CHLORIDE 1000 ML: 526; 502; 368; 37; 30 INJECTION, SOLUTION INTRAVENOUS at 14:48

## 2017-01-01 RX ADMIN — DEXAMETHASONE SODIUM PHOSPHATE 4 MG: 4 INJECTION, SOLUTION INTRAMUSCULAR; INTRAVENOUS at 16:55

## 2017-01-01 RX ADMIN — CYANOCOBALAMIN 1000 MCG: 1000 INJECTION, SOLUTION INTRAMUSCULAR; SUBCUTANEOUS at 13:40

## 2017-01-01 RX ADMIN — MIDAZOLAM 2 MG: 1 INJECTION INTRAMUSCULAR; INTRAVENOUS at 08:03

## 2017-01-01 RX ADMIN — ONDANSETRON 4 MG: 2 INJECTION INTRAMUSCULAR; INTRAVENOUS at 09:20

## 2017-01-01 RX ADMIN — KETAMINE HYDROCHLORIDE 50 MG: 100 INJECTION INTRAMUSCULAR; INTRAVENOUS at 08:03

## 2017-01-01 RX ADMIN — CYANOCOBALAMIN 1000 MCG: 1000 INJECTION, SOLUTION INTRAMUSCULAR; SUBCUTANEOUS at 13:56

## 2017-01-01 RX ADMIN — CYANOCOBALAMIN 1000 MCG: 1000 INJECTION, SOLUTION INTRAMUSCULAR; SUBCUTANEOUS at 14:08

## 2017-01-01 RX ADMIN — PROPOFOL 20 MG: 10 INJECTION, EMULSION INTRAVENOUS at 08:08

## 2017-01-01 RX ADMIN — ONDANSETRON 4 MG: 2 INJECTION INTRAMUSCULAR; INTRAVENOUS at 16:55

## 2017-01-01 RX ADMIN — LIDOCAINE HYDROCHLORIDE 60 MG: 20 INJECTION, SOLUTION INFILTRATION; PERINEURAL at 16:43

## 2017-01-01 RX ADMIN — PROPOFOL 130 MG: 10 INJECTION, EMULSION INTRAVENOUS at 16:43

## 2017-01-01 RX ADMIN — FENTANYL CITRATE 25 MCG: 50 INJECTION, SOLUTION INTRAMUSCULAR; INTRAVENOUS at 16:08

## 2017-01-01 RX ADMIN — PROPOFOL 20 MG: 10 INJECTION, EMULSION INTRAVENOUS at 08:15

## 2017-01-01 RX ADMIN — DEXTROSE AND SODIUM CHLORIDE 30 ML/HR: 5; 450 INJECTION, SOLUTION INTRAVENOUS at 07:28

## 2017-01-01 RX ADMIN — HYDROMORPHONE HYDROCHLORIDE 0.5 MG: 1 INJECTION, SOLUTION INTRAMUSCULAR; INTRAVENOUS; SUBCUTANEOUS at 17:00

## 2017-01-01 RX ADMIN — HYDROMORPHONE HYDROCHLORIDE 0.5 MG: 1 INJECTION, SOLUTION INTRAMUSCULAR; INTRAVENOUS; SUBCUTANEOUS at 17:25

## 2017-01-01 RX ADMIN — MIDAZOLAM 2 MG: 1 INJECTION INTRAMUSCULAR; INTRAVENOUS at 16:38

## 2017-01-12 DIAGNOSIS — E78.5 HYPERLIPIDEMIA, UNSPECIFIED HYPERLIPIDEMIA TYPE: Primary | ICD-10-CM

## 2017-01-12 DIAGNOSIS — I10 ESSENTIAL HYPERTENSION: ICD-10-CM

## 2017-01-12 DIAGNOSIS — Z00.00 ROUTINE GENERAL MEDICAL EXAMINATION AT A HEALTH CARE FACILITY: ICD-10-CM

## 2017-01-24 ENCOUNTER — LAB (OUTPATIENT)
Dept: LAB | Facility: OTHER | Age: 72
End: 2017-01-24

## 2017-01-24 DIAGNOSIS — E78.5 HYPERLIPIDEMIA, UNSPECIFIED HYPERLIPIDEMIA TYPE: ICD-10-CM

## 2017-01-24 DIAGNOSIS — Z00.00 ROUTINE GENERAL MEDICAL EXAMINATION AT A HEALTH CARE FACILITY: ICD-10-CM

## 2017-01-24 DIAGNOSIS — I10 ESSENTIAL HYPERTENSION: ICD-10-CM

## 2017-01-24 LAB
ALBUMIN SERPL-MCNC: 4.1 G/DL (ref 3.2–5.5)
ALBUMIN/GLOB SERPL: 1.2 G/DL (ref 1–3)
ALP SERPL-CCNC: 77 U/L (ref 15–121)
ALT SERPL W P-5'-P-CCNC: 17 U/L (ref 10–60)
ANION GAP SERPL CALCULATED.3IONS-SCNC: 8 MMOL/L (ref 5–15)
AST SERPL-CCNC: 16 U/L (ref 10–60)
BASOPHILS # BLD AUTO: 0.04 10*3/MM3 (ref 0–0.2)
BASOPHILS NFR BLD AUTO: 0.6 % (ref 0–2)
BILIRUB SERPL-MCNC: 1.1 MG/DL (ref 0.2–1)
BUN BLD-MCNC: 21 MG/DL (ref 8–25)
BUN/CREAT SERPL: 21 (ref 7–25)
CALCIUM SPEC-SCNC: 9.4 MG/DL (ref 8.4–10.8)
CHLORIDE SERPL-SCNC: 105 MMOL/L (ref 100–112)
CHOLEST SERPL-MCNC: 166 MG/DL (ref 150–200)
CO2 SERPL-SCNC: 25 MMOL/L (ref 20–32)
CREAT BLD-MCNC: 1 MG/DL (ref 0.4–1.3)
DEPRECATED RDW RBC AUTO: 46.4 FL (ref 35.1–43.9)
EOSINOPHIL # BLD AUTO: 0.1 10*3/MM3 (ref 0–0.7)
EOSINOPHIL NFR BLD AUTO: 1.5 % (ref 0–7)
ERYTHROCYTE [DISTWIDTH] IN BLOOD BY AUTOMATED COUNT: 13.6 % (ref 11.5–14.5)
GFR SERPL CREATININE-BSD FRML MDRD: 73 ML/MIN/1.73 (ref 42–98)
GLOBULIN UR ELPH-MCNC: 3.4 GM/DL (ref 2.5–4.6)
GLUCOSE BLD-MCNC: 124 MG/DL (ref 70–100)
HCT VFR BLD AUTO: 41.9 % (ref 39–49)
HDLC SERPL-MCNC: 40 MG/DL (ref 35–100)
HGB BLD-MCNC: 14.5 G/DL (ref 13.7–17.3)
LDLC SERPL CALC-MCNC: 99 MG/DL
LDLC/HDLC SERPL: 2.47 {RATIO}
LYMPHOCYTES # BLD AUTO: 1.39 10*3/MM3 (ref 0.6–4.2)
LYMPHOCYTES NFR BLD AUTO: 20.3 % (ref 10–50)
MCH RBC QN AUTO: 33.3 PG (ref 26.5–34)
MCHC RBC AUTO-ENTMCNC: 34.6 G/DL (ref 31.5–36.3)
MCV RBC AUTO: 96.3 FL (ref 80–98)
MONOCYTES # BLD AUTO: 0.69 10*3/MM3 (ref 0–0.9)
MONOCYTES NFR BLD AUTO: 10.1 % (ref 0–12)
NEUTROPHILS # BLD AUTO: 4.62 10*3/MM3 (ref 2–8.6)
NEUTROPHILS NFR BLD AUTO: 67.5 % (ref 37–80)
PLATELET # BLD AUTO: 218 10*3/MM3 (ref 150–450)
PMV BLD AUTO: 10.5 FL (ref 8–12)
POTASSIUM BLD-SCNC: 3.7 MMOL/L (ref 3.4–5.4)
PROT SERPL-MCNC: 7.5 G/DL (ref 6.7–8.2)
RBC # BLD AUTO: 4.35 10*6/MM3 (ref 4.37–5.74)
SODIUM BLD-SCNC: 138 MMOL/L (ref 134–146)
TRIGL SERPL-MCNC: 137 MG/DL (ref 35–160)
VLDLC SERPL-MCNC: 27.4 MG/DL
WBC NRBC COR # BLD: 6.84 10*3/MM3 (ref 3.2–9.8)

## 2017-01-24 PROCEDURE — 80061 LIPID PANEL: CPT | Performed by: FAMILY MEDICINE

## 2017-01-24 PROCEDURE — 84439 ASSAY OF FREE THYROXINE: CPT | Performed by: FAMILY MEDICINE

## 2017-01-24 PROCEDURE — 80053 COMPREHEN METABOLIC PANEL: CPT | Performed by: FAMILY MEDICINE

## 2017-01-24 PROCEDURE — 85025 COMPLETE CBC W/AUTO DIFF WBC: CPT | Performed by: FAMILY MEDICINE

## 2017-01-24 PROCEDURE — 84443 ASSAY THYROID STIM HORMONE: CPT | Performed by: FAMILY MEDICINE

## 2017-01-25 LAB
T4 FREE SERPL-MCNC: 1.57 NG/DL (ref 0.78–2.19)
TSH SERPL DL<=0.05 MIU/L-ACNC: 0.51 MIU/ML (ref 0.46–4.68)

## 2017-01-26 ENCOUNTER — OFFICE VISIT (OUTPATIENT)
Dept: FAMILY MEDICINE CLINIC | Facility: CLINIC | Age: 72
End: 2017-01-26

## 2017-01-26 ENCOUNTER — CLINICAL SUPPORT (OUTPATIENT)
Dept: FAMILY MEDICINE CLINIC | Facility: CLINIC | Age: 72
End: 2017-01-26

## 2017-01-26 VITALS
BODY MASS INDEX: 29.39 KG/M2 | DIASTOLIC BLOOD PRESSURE: 78 MMHG | HEIGHT: 72 IN | SYSTOLIC BLOOD PRESSURE: 130 MMHG | WEIGHT: 217 LBS

## 2017-01-26 DIAGNOSIS — J44.9 CHRONIC OBSTRUCTIVE PULMONARY DISEASE, UNSPECIFIED COPD TYPE (HCC): Primary | ICD-10-CM

## 2017-01-26 DIAGNOSIS — E78.5 HYPERLIPIDEMIA, UNSPECIFIED HYPERLIPIDEMIA TYPE: ICD-10-CM

## 2017-01-26 DIAGNOSIS — D51.0 PERNICIOUS ANEMIA: ICD-10-CM

## 2017-01-26 DIAGNOSIS — J31.0 CHRONIC RHINITIS: ICD-10-CM

## 2017-01-26 PROCEDURE — 96372 THER/PROPH/DIAG INJ SC/IM: CPT | Performed by: FAMILY MEDICINE

## 2017-01-26 PROCEDURE — 99214 OFFICE O/P EST MOD 30 MIN: CPT | Performed by: FAMILY MEDICINE

## 2017-01-26 RX ORDER — MONTELUKAST SODIUM 10 MG/1
10 TABLET ORAL NIGHTLY
Qty: 30 TABLET | Refills: 5 | Status: SHIPPED | OUTPATIENT
Start: 2017-01-26 | End: 2017-03-02 | Stop reason: SDDI

## 2017-01-26 RX ADMIN — CYANOCOBALAMIN 1000 MCG: 1000 INJECTION, SOLUTION INTRAMUSCULAR; SUBCUTANEOUS at 15:16

## 2017-01-26 NOTE — MR AVS SNAPSHOT
Dru Brownlee   1/26/2017 2:45 PM   Office Visit    Dept Phone:  [phone]   Encounter #:  [id number]    Provider:  Pretty Dior MD   Department:  [facility] PRIMARY CARE [place]                Your Full Care Plan              Where to Get Your Medications      These medications were sent to [facility] - [place] [facility]ast - [address] [facility]  - [phone]  - [phone] [address] [place] [address]     Phone:  [phone]     montelukast 10 MG tablet            Your Updated Medication List          This list is accurate as of: 1/26/17  3:02 PM.  Always use your most recent med list.                * albuterol (2.5 MG/3ML) 0.083% nebulizer solution   Commonly known as:  PROVENTIL       * VENTOLIN  (90 BASE) MCG/ACT inhaler   Generic drug:  albuterol       amiodarone 200 MG tablet   Commonly known as:  PACERONE       amLODIPine 10 MG tablet   Commonly known as:  NORVASC       ELIQUIS 5 MG tablet tablet   Generic drug:  apixaban   TAKE 1 TABLET TWICE A DAY       Fluticasone Furoate-Vilanterol 100-25 MCG/INH aerosol powder    Inhale 1 inhaler Daily.       furosemide 20 MG tablet   Commonly known as:  LASIX       metoprolol succinate XL 50 MG 24 hr tablet   Commonly known as:  TOPROL-XL       montelukast 10 MG tablet   Commonly known as:  SINGULAIR   Take 1 tablet by mouth Every Night for 180 days.       olopatadine 0.6 % solution nasal solution   Commonly known as:  PATANASE       pravastatin 40 MG tablet   Commonly known as:  PRAVACHOL       PRILOSEC 20 MG capsule   Generic drug:  omeprazole       VITAMIN B-12 ER PO       * Notice:  This list has 2 medication(s) that are the same as other medications prescribed for you. Read the directions carefully, and ask your doctor or other care provider to review them with you.            You Were Diagnosed With        Codes Comments    Chronic obstructive pulmonary disease,  unspecified COPD type    -  Primary ICD-10-CM: J44.9  ICD-9-CM: 496     Chronic rhinitis     ICD-10-CM: J31.0  ICD-9-CM: 472.0     Hyperlipidemia, unspecified hyperlipidemia type     ICD-10-CM: E78.5  ICD-9-CM: 272.4       Medications to be Given to You by a Medical Professional     Due       Frequency    2016 cyanocobalamin injection 1,000 mcg  Every 28 Days      Instructions     None    Patient Instructions History      Upcoming Appointments     Visit Type Date Time Department    FOLLOW UP 2017  2:45 PM [facility]    FOLLOW UP 3/2/2017  1:45 PM [facility] CARDIOLOGY [facility]    FOLLOW UP - ONCOLOGY 2017  2:00 PM [facility] RAD ONC [facility]    OFFICE VISIT 10/26/2017  1:30 PM [facility] GEN SURGERY [facility]      MyChart Signup     [employer] allows you to send messages to your doctor, view your test results, renew your prescriptions, schedule appointments, and more. To sign up, go to [employer] and click on the Sign Up Now link in the New User? box. Enter your [employer] Activation Code exactly as it appears below along with the last four digits of your Social Security Number and your Date of Birth () to complete the sign-up process. If you do not sign up before the expiration date, you must request a new code.    [employer] Activation Code: [email or web address]  Expires: 2017  3:02 PM    If you have questions, you can email [email or web address] or call [phone] to talk to our [employer] staff. Remember, [employer] is NOT to be used for urgent needs. For medical emergencies, dial 911.               Other Info from Your Visit           Your Appointments     Mar 02, 2017  1:45 PM CST   Follow Up with Hugh Antunez MD   [facility] (--)    [address] [place] [address] Dr [place]zia [place]AN [id number] [phone]           Arrive 15 minutes prior to appointment.            2017  2:00 PM CDT   FOLLOW UP with Erlin Wick MD   [facility] RADIATION ONCOLOGY (--)    [address] [place] [address]  "Dr Nikunj MCCLELLAN [phone]            Oct 26, 2017  1:30 PM CDT   Office Visit with Ken Garcia MD   [facility] GENERAL SURGERY (--)    [address] [place] [address] Dr [place]CC[place]AN [id number] [phone]           Arrive 15 minutes prior to appointment.              Allergies     No Known Allergies      Reason for Visit     Follow-up 6 month check up     Labs Only           Vital Signs     Blood Pressure Height Weight Body Mass Index Smoking Status       130/78 72\" (182.9 cm) 217 lb (98.4 kg) 29.43 kg/m2 Never Smoker       Problems and Diagnoses Noted     Abrasion of hip or leg    Acute bronchitis    Acute sore throat    Acute sinus infection    Acute bacterial otitis media    Acute upper respiratory infection    Nasal inflammation due to allergen    Atrial fibrillation (irregular heartbeat)    Chronic anemia    Chronic inflammation of the nose    Heart failure    Breathlessness on exertion    Encounter for immunization    Screening for prostate cancer    Fibrosis of lung    H/O arthritis    Hiatal hernia    History of diagnostic ultrasound    History of echocardiography    History of PFTs    Hyperglycemia    High cholesterol or triglycerides    High cholesterol or triglycerides    High blood pressure    Kidney stone    Male erectile disorder    Multinodular goiter    Needs flu shot    Skin tumor    Pernicious anemia    Primary malignant neoplasm of prostate    Elevated prostate specific antigen (PSA)    Rheumatoid arthritis    Thyroid nodule    Inguinal hernia    Viral upper respiratory tract infection    Chronic airway obstruction    -  Primary        "

## 2017-01-26 NOTE — PROGRESS NOTES
Subjective   Dru Brownlee is a 71 y.o. male.  He is here today to follow-up on hyperlipidemia and chronic atrial fibrillation and had recent lab work.  He is complaining of some occasional wheezing and COPD symptoms but the Breo inhaler made his heart race and made him feel jittery.  He also would like something to help dry up the nasal drainage.  We had written some Singulair but evidently he didn't get it last year when it was prescribed.    History of Present Illness   COPD   This is a chronic problem. The current episode started more than 1 year ago. The problem occurs daily. The problem has been waxing and waning. Associated symptoms include arthralgias, coughing and fatigue. Pertinent negatives include no abdominal pain, anorexia, change in bowel habit, chest pain, chills, congestion, diaphoresis, fever, headaches, joint swelling, myalgias, nausea, neck pain, numbness, rash, sore throat, urinary symptoms, vertigo, visual change, vomiting or weakness. The symptoms are aggravated by exertion. Treatments tried: albuterol. The treatment provided mild relief.     The following portions of the patient's history were reviewed and updated as appropriate: allergies, current medications, past family history, past medical history, past social history, past surgical history and problem list.    Review of Systems   Constitutional: Negative for activity change, appetite change, diaphoresis, fatigue, fever and unexpected weight change.   HENT: Negative for congestion, ear pain, hearing loss, sinus pressure, sore throat, tinnitus, trouble swallowing and voice change.    Eyes: Negative.    Respiratory: Negative.    Cardiovascular: Negative.    Gastrointestinal: Negative for abdominal distention, abdominal pain, blood in stool, constipation, diarrhea, nausea and vomiting.   Endocrine: Negative.    Genitourinary: Negative for decreased urine volume, dysuria, frequency, hematuria and urgency.   Musculoskeletal: Negative.     Skin: Negative.    Allergic/Immunologic: Negative.    Neurological: Negative for dizziness, tremors, seizures, syncope, speech difficulty, weakness, numbness and headaches.   Hematological: Negative.    Psychiatric/Behavioral: Negative for dysphoric mood and sleep disturbance. The patient is not nervous/anxious.        Objective   Physical Exam   Constitutional: He is oriented to person, place, and time. He appears well-developed and well-nourished. No distress.   HENT:   Head: Normocephalic and atraumatic.   Nose: Nose normal.   Mouth/Throat: Oropharynx is clear and moist.   Eyes: Conjunctivae and EOM are normal. Pupils are equal, round, and reactive to light.   Neck: Normal range of motion. Neck supple. No JVD present. No tracheal deviation present. No thyromegaly present.   Cardiovascular: Normal rate, regular rhythm, normal heart sounds and intact distal pulses.    No murmur heard.  Pulmonary/Chest: Effort normal. He has wheezes. He exhibits no tenderness.   Abdominal: Soft. Bowel sounds are normal. He exhibits no distension and no mass. There is no tenderness.   Musculoskeletal: Normal range of motion. He exhibits no edema or tenderness.   Lymphadenopathy:     He has no cervical adenopathy.   Neurological: He is alert and oriented to person, place, and time. He exhibits normal muscle tone. Coordination normal.   Skin: Skin is warm and dry. No rash noted. There is erythema. No pallor.   Psychiatric: He has a normal mood and affect.   Nursing note and vitals reviewed.    Lab on 01/24/2017   Component Date Value Ref Range Status   • Free T4 01/24/2017 1.57  0.78 - 2.19 ng/dL Final   • TSH 01/24/2017 0.510  0.460 - 4.680 mIU/mL Final   • Glucose 01/24/2017 124* 70 - 100 mg/dL Final   • BUN 01/24/2017 21  8 - 25 mg/dL Final   • Creatinine 01/24/2017 1.00  0.40 - 1.30 mg/dL Final   • Sodium 01/24/2017 138  134 - 146 mmol/L Final   • Potassium 01/24/2017 3.7  3.4 - 5.4 mmol/L Final   • Chloride 01/24/2017 105  100  - 112 mmol/L Final   • CO2 01/24/2017 25.0  20.0 - 32.0 mmol/L Final   • Calcium 01/24/2017 9.4  8.4 - 10.8 mg/dL Final   • Total Protein 01/24/2017 7.5  6.7 - 8.2 g/dL Final   • Albumin 01/24/2017 4.10  3.20 - 5.50 g/dL Final   • ALT (SGPT) 01/24/2017 17  10 - 60 U/L Final   • AST (SGOT) 01/24/2017 16  10 - 60 U/L Final   • Alkaline Phosphatase 01/24/2017 77  15 - 121 U/L Final   • Total Bilirubin 01/24/2017 1.1* 0.2 - 1.0 mg/dL Final   • eGFR Non  Amer 01/24/2017 73  42 - 98 mL/min/1.73 Final   • Globulin 01/24/2017 3.4  2.5 - 4.6 gm/dL Final   • A/G Ratio 01/24/2017 1.2  1.0 - 3.0 g/dL Final   • BUN/Creatinine Ratio 01/24/2017 21.0  7.0 - 25.0 Final   • Anion Gap 01/24/2017 8.0  5.0 - 15.0 mmol/L Final   • Total Cholesterol 01/24/2017 166  150 - 200 mg/dL Final   • Triglycerides 01/24/2017 137  35 - 160 mg/dL Final   • HDL Cholesterol 01/24/2017 40  35 - 100 mg/dL Final   • LDL Cholesterol  01/24/2017 99  mg/dL Final   • VLDL Cholesterol 01/24/2017 27.4  mg/dL Final   • LDL/HDL Ratio 01/24/2017 2.47   Final   • WBC 01/24/2017 6.84  3.20 - 9.80 10*3/mm3 Final   • RBC 01/24/2017 4.35* 4.37 - 5.74 10*6/mm3 Final   • Hemoglobin 01/24/2017 14.5  13.7 - 17.3 g/dL Final   • Hematocrit 01/24/2017 41.9  39.0 - 49.0 % Final   • MCV 01/24/2017 96.3  80.0 - 98.0 fL Final   • MCH 01/24/2017 33.3  26.5 - 34.0 pg Final   • MCHC 01/24/2017 34.6  31.5 - 36.3 g/dL Final   • RDW 01/24/2017 13.6  11.5 - 14.5 % Final   • RDW-SD 01/24/2017 46.4* 35.1 - 43.9 fl Final   • MPV 01/24/2017 10.5  8.0 - 12.0 fL Final   • Platelets 01/24/2017 218  150 - 450 10*3/mm3 Final   • Neutrophil % 01/24/2017 67.5  37.0 - 80.0 % Final   • Lymphocyte % 01/24/2017 20.3  10.0 - 50.0 % Final   • Monocyte % 01/24/2017 10.1  0.0 - 12.0 % Final   • Eosinophil % 01/24/2017 1.5  0.0 - 7.0 % Final   • Basophil % 01/24/2017 0.6  0.0 - 2.0 % Final   • Neutrophils, Absolute 01/24/2017 4.62  2.00 - 8.60 10*3/mm3 Final   • Lymphocytes, Absolute 01/24/2017 1.39   0.60 - 4.20 10*3/mm3 Final   • Monocytes, Absolute 01/24/2017 0.69  0.00 - 0.90 10*3/mm3 Final   • Eosinophils, Absolute 01/24/2017 0.10  0.00 - 0.70 10*3/mm3 Final   • Basophils, Absolute 01/24/2017 0.04  0.00 - 0.20 10*3/mm3 Final   ]    Assessment/Plan   Dru was seen today for follow-up and labs only.    Diagnoses and all orders for this visit:    Chronic obstructive pulmonary disease, unspecified COPD type    Chronic rhinitis    Hyperlipidemia, unspecified hyperlipidemia type    Other orders  -     montelukast (SINGULAIR) 10 MG tablet; Take 1 tablet by mouth Every Night for 180 days.   this and the Singulair again so that he can get this for the rhinitis symptoms and he can take an antihistamine with that as well, contact me if not improving and may need to consider allergy testing    Gave a sample of Asmanex inhaler 2 puffs twice a day for the fibrosis/COPD symptoms    Review labs as above.  Continue with current medications otherwise

## 2017-03-02 ENCOUNTER — OFFICE VISIT (OUTPATIENT)
Dept: CARDIOLOGY | Facility: CLINIC | Age: 72
End: 2017-03-02

## 2017-03-02 VITALS
DIASTOLIC BLOOD PRESSURE: 70 MMHG | RESPIRATION RATE: 16 BRPM | HEIGHT: 72 IN | SYSTOLIC BLOOD PRESSURE: 122 MMHG | WEIGHT: 211 LBS | BODY MASS INDEX: 28.58 KG/M2 | HEART RATE: 72 BPM | OXYGEN SATURATION: 97 %

## 2017-03-02 DIAGNOSIS — I48.0 PAROXYSMAL ATRIAL FIBRILLATION (HCC): Primary | ICD-10-CM

## 2017-03-02 DIAGNOSIS — E78.5 HYPERLIPIDEMIA, UNSPECIFIED HYPERLIPIDEMIA TYPE: ICD-10-CM

## 2017-03-02 DIAGNOSIS — I10 ESSENTIAL HYPERTENSION: ICD-10-CM

## 2017-03-02 PROCEDURE — 99213 OFFICE O/P EST LOW 20 MIN: CPT | Performed by: INTERNAL MEDICINE

## 2017-03-02 NOTE — PROGRESS NOTES
Chief complaint : Dyspnea    History of Present Illness patient presents today for regular follow-up visit.  He has no chest pain or chest discomfort.  He has no palpitations.  His dyspnea has improved significantly.    Subjective      Review of Systems   Constitution: Negative. Negative for decreased appetite, diaphoresis, weakness, night sweats, weight gain and weight loss.   HENT: Negative for headaches, hearing loss, nosebleeds and sore throat.    Eyes: Negative.  Negative for blurred vision and photophobia.   Cardiovascular: Negative for chest pain, claudication, dyspnea on exertion, irregular heartbeat, leg swelling, palpitations, paroxysmal nocturnal dyspnea and syncope.   Respiratory: Negative for cough, hemoptysis, shortness of breath and wheezing.    Endocrine: Negative for cold intolerance, heat intolerance, polydipsia, polyphagia and polyuria.   Hematologic/Lymphatic: Negative.    Skin: Negative for color change, dry skin, flushing, itching and rash.   Musculoskeletal: Negative.  Negative for muscle cramps, muscle weakness and myalgias.   Gastrointestinal: Negative for abdominal pain, change in bowel habit, diarrhea, hematemesis, melena, nausea and vomiting.   Genitourinary: Negative for dysuria, frequency and hematuria.   Neurological: Negative for dizziness, focal weakness, light-headedness, loss of balance, numbness and seizures.   Psychiatric/Behavioral: Negative.  Negative for substance abuse, suicidal ideas and thoughts of violence.   Allergic/Immunologic: Negative.        Past Medical History   Diagnosis Date   • Abrasion of lower limb      Abrasion and/or friction burn of lower limb, infected   • Acute bronchitis    • Acute pharyngitis    • Acute sinusitis    • Acute suppurative otitis media without spontaneous rupture of ear drum    • Acute upper respiratory infection    • Allergic rhinitis    • Atrial fibrillation      Atrial fibrillation - SR   • Chronic anemia    • Chronic rhinitis    •  Congestive heart failure    • Dyspnea on exertion    • Encounter for immunization    • Encounter for screening for malignant neoplasm of prostate      Screening for malignant neoplasm of prostate   • Essential hypertension    • Fibrosis of lung    • H/O arthritis    • Hiatal hernia    • History of diagnostic ultrasound 07/16/2015     US SOFT TISSUE OR THYROID 15592 ([facility]) (1) - [facility] ([facility])    • History of echocardiogram 06/17/2016     ECHO W/M MODE 89454 ([facility]) (2) - [facility] ([place])    • History of PFTs 06/17/2016     PFT - PRE AND POST 72702 ([facility]) (1) - [facility] ([facility])    • Hyperglycemia    • Hyperlipidemia    • Hypertensive disorder    • Kidney stone    • Male erectile disorder    • Multinodular goiter    • Need for prophylactic vaccination and inoculation against influenza    • Neoplasm of uncertain behavior of skin    • Pernicious anemia    • Primary malignant neoplasm of prostate    • Raised prostate specific antigen    • Rheumatoid arthritis    • Thyroid nodule    • Unilateral inguinal hernia    • Viral upper respiratory tract infection        Family History   Problem Relation Age of Onset   • Cancer Other    • Diabetes Other    • Heart disease Other    • COPD Other    • Other Other      BLACK LUNG       Review of patient's allergies indicates no known allergies.     reports that he has never smoked. He has never used smokeless tobacco. He reports that he does not drink alcohol or use illicit drugs.    Objective     Vital Signs  Heart Rate:  [72] 72  Resp:  [16] 16  BP: (122)/(70) 122/70    Physical Exam   Constitutional: He is oriented to person, place, and time. He appears well-developed and well-nourished.   HENT:   Head: Normocephalic and atraumatic.   Eyes: Conjunctivae and EOM are normal. Pupils are equal, round, and reactive to light.   Neck: Neck supple. No JVD present. Carotid bruit is not present. No tracheal deviation and no edema present.   Cardiovascular: Normal rate, regular  rhythm, S1 normal, S2 normal, normal heart sounds and intact distal pulses.  Exam reveals no gallop, no S3, no S4 and no friction rub.    No murmur heard.  Pulmonary/Chest: Effort normal and breath sounds normal. He has no wheezes. He has no rales. He exhibits no tenderness.   Abdominal: Bowel sounds are normal. He exhibits no abdominal bruit and no pulsatile midline mass. There is no rebound and no guarding.   Musculoskeletal: Normal range of motion. He exhibits no edema.   Neurological: He is alert and oriented to person, place, and time.   Skin: Skin is warm and dry.   Psychiatric: He has a normal mood and affect.       Procedures    Assessment/Plan     Patient Active Problem List   Diagnosis   • Paroxysmal atrial fibrillation   • Essential hypertension   • Hyperlipemia   • Viral upper respiratory tract infection   • Unilateral inguinal hernia   • Thyroid nodule   • Rheumatoid arthritis   • Raised prostate specific antigen   • Pernicious anemia   • Primary malignant neoplasm of prostate   • Neoplasm of uncertain behavior of skin   • Need for prophylactic vaccination and inoculation against influenza   • Multinodular goiter   • Male erectile disorder   • Kidney stone   • Hypertensive disorder   • Hyperlipidemia   • Hyperglycemia   • History of PFTs   • History of echocardiogram   • History of diagnostic ultrasound   • Hiatal hernia   • H/O arthritis   • Fibrosis of lung   • Encounter for screening for malignant neoplasm of prostate   • Dyspnea on exertion   • Encounter for immunization   • Congestive heart failure   • Chronic rhinitis   • Chronic anemia   • Atrial fibrillation   • Allergic rhinitis   • Acute upper respiratory infection   • Acute suppurative otitis media without spontaneous rupture of ear drum   • Acute sinusitis   • Acute pharyngitis   • Acute bronchitis   • Abrasion of lower limb     1. Paroxysmal atrial fibrillation  Patient currently is sinus rhythm.  He is tolerating the eliquis very well with  no complications.  Plan is to continue with current medications.    2. Essential hypertension  Patient's blood pressure is well-controlled.  We'll continue with current medications.    3. Hyperlipidemia, unspecified hyperlipidemia type  Component      Latest Ref Rng 5/22/2014 1/21/2015 7/8/2015 2/26/2016 7/28/2016   Total Cholesterol      150 - 200 mg/dL 139 (L)  141 (L) 158 165   Triglycerides      35 - 160 mg/dL 122  127 124 127   HDL Cholesterol      35 - 100 mg/dL 36.0  39.0 39.2 40.7   LDL Cholesterol      mg/dL 79 76.0 77 94 99   VLDL Cholesterol      mg/dL        LDL/HDL Ratio                Component      Latest Ref Rng 1/24/2017   Total Cholesterol      150 - 200 mg/dL 166   Triglycerides      35 - 160 mg/dL 137   HDL Cholesterol      35 - 100 mg/dL 40   LDL Cholesterol      mg/dL 99   VLDL Cholesterol      mg/dL 27.4   LDL/HDL Ratio       2.47   We will continue with current dose of pravastatin.    I discussed the patients findings and my recommendations with patient.    Hugh Antunez MD  03/02/17  2:09 PM    EMR Dragon/Transcription disclaimer:   Much of this encounter note is an electronic transcription/translation of spoken language to printed text. The electronic translation of spoken language may permit erroneous, or at times, nonsensical words or phrases to be inadvertently transcribed; Although I have reviewed the note for such errors, some may still exist.

## 2017-03-03 RX ORDER — OMEPRAZOLE 20 MG/1
CAPSULE, DELAYED RELEASE ORAL
Qty: 180 CAPSULE | Refills: 3 | Status: SHIPPED | OUTPATIENT
Start: 2017-03-03 | End: 2017-03-22 | Stop reason: SDUPTHER

## 2017-03-03 RX ORDER — AMLODIPINE BESYLATE 10 MG/1
TABLET ORAL
Qty: 90 TABLET | Refills: 3 | Status: SHIPPED | OUTPATIENT
Start: 2017-03-03 | End: 2017-03-22 | Stop reason: SDUPTHER

## 2017-03-07 ENCOUNTER — CLINICAL SUPPORT (OUTPATIENT)
Dept: FAMILY MEDICINE CLINIC | Facility: CLINIC | Age: 72
End: 2017-03-07

## 2017-03-07 DIAGNOSIS — D51.0 PERNICIOUS ANEMIA: Primary | ICD-10-CM

## 2017-03-07 PROCEDURE — 96372 THER/PROPH/DIAG INJ SC/IM: CPT | Performed by: FAMILY MEDICINE

## 2017-03-07 RX ADMIN — CYANOCOBALAMIN 1000 MCG: 1000 INJECTION, SOLUTION INTRAMUSCULAR; SUBCUTANEOUS at 14:52

## 2017-03-21 ENCOUNTER — OFFICE VISIT (OUTPATIENT)
Dept: FAMILY MEDICINE CLINIC | Facility: CLINIC | Age: 72
End: 2017-03-21

## 2017-03-21 VITALS
HEART RATE: 67 BPM | BODY MASS INDEX: 29.09 KG/M2 | DIASTOLIC BLOOD PRESSURE: 82 MMHG | WEIGHT: 214.8 LBS | HEIGHT: 72 IN | OXYGEN SATURATION: 95 % | SYSTOLIC BLOOD PRESSURE: 138 MMHG

## 2017-03-21 DIAGNOSIS — J60 BLACK LUNG DISEASE (HCC): Primary | ICD-10-CM

## 2017-03-21 PROCEDURE — 99213 OFFICE O/P EST LOW 20 MIN: CPT | Performed by: FAMILY MEDICINE

## 2017-03-21 RX ORDER — ALBUTEROL SULFATE 90 UG/1
2 AEROSOL, METERED RESPIRATORY (INHALATION) EVERY 4 HOURS PRN
Qty: 6 INHALER | Refills: 3 | Status: SHIPPED | OUTPATIENT
Start: 2017-03-21 | End: 2017-03-28 | Stop reason: SDUPTHER

## 2017-03-21 NOTE — PROGRESS NOTES
Subjective   Dru Brownlee is a 71 y.o. male.  He is here today for evaluation for chronic lung disease.  Was diagnosed with coworkers pneumoconiosis, stage III.  It was thought that he needed supplemental oxygen and this was recommended after his evaluation for the black lung.  His O2 sats today dropped to 82% with very minimal ambulation, and responded well to supplemental oxygen coming back up to over 92%.  I have studies done at the hospital on the [facility] including an arterial blood gases showing O2 sat of 90.7% with PCO2 30.6 PO2 53.5 and pH 7.491 on room air.  The rest however    History of Present Illness     The following portions of the patient's history were reviewed and updated as appropriate: allergies, current medications, past family history, past medical history, past social history, past surgical history and problem list.    Review of Systems   Constitutional: Positive for fatigue. Negative for activity change, appetite change, diaphoresis, fever and unexpected weight change.   HENT: Negative for congestion, ear pain, hearing loss, sinus pressure, sore throat, tinnitus, trouble swallowing and voice change.    Eyes: Negative.    Respiratory: Positive for cough, shortness of breath and wheezing.    Cardiovascular: Negative.    Gastrointestinal: Negative for abdominal distention, abdominal pain, blood in stool, constipation, diarrhea, nausea and vomiting.   Endocrine: Negative.    Genitourinary: Negative for decreased urine volume, dysuria, frequency, hematuria and urgency.   Musculoskeletal: Positive for arthralgias, joint swelling and myalgias.   Skin: Negative.    Allergic/Immunologic: Negative.    Neurological: Negative for dizziness, tremors, seizures, syncope, speech difficulty, weakness, numbness and headaches.   Hematological: Negative.    Psychiatric/Behavioral: Negative for dysphoric mood and sleep disturbance. The patient is not nervous/anxious.        Objective   Physical  Exam   Constitutional: He is oriented to person, place, and time. He appears well-developed and well-nourished. No distress.   HENT:   Head: Normocephalic and atraumatic.   Nose: Nose normal.   Mouth/Throat: Oropharynx is clear and moist.   Eyes: Conjunctivae and EOM are normal. Pupils are equal, round, and reactive to light.   Neck: Normal range of motion. Neck supple. No JVD present. No tracheal deviation present. No thyromegaly present.   Cardiovascular: Normal rate, regular rhythm, normal heart sounds and intact distal pulses.    No murmur heard.  Pulmonary/Chest: No respiratory distress. He has wheezes. He exhibits no tenderness.   Diminished overall   Abdominal: Soft. Bowel sounds are normal. He exhibits no distension and no mass. There is no tenderness.   Musculoskeletal: Normal range of motion. He exhibits no edema or tenderness.   Lymphadenopathy:     He has no cervical adenopathy.   Neurological: He is alert and oriented to person, place, and time. He exhibits normal muscle tone. Coordination normal.   Skin: Skin is warm and dry. No rash noted. No erythema. No pallor.   Psychiatric: He has a normal mood and affect.   Nursing note and vitals reviewed.      Assessment/Plan   Dru was seen today for shortness of breath.    Diagnoses and all orders for this visit:    Black lung disease    Other orders  -     albuterol (VENTOLIN HFA) 108 (90 BASE) MCG/ACT inhaler; Inhale 2 puffs Every 4 (Four) Hours As Needed for Shortness of Air.   refilled albuterol inhaler    Recommend supplemental oxygen at 2 L as he desaturates very quickly but responds well to the supplemental oxygen

## 2017-03-22 RX ORDER — OMEPRAZOLE 20 MG/1
CAPSULE, DELAYED RELEASE ORAL
Qty: 180 CAPSULE | Refills: 3 | Status: SHIPPED | OUTPATIENT
Start: 2017-03-22 | End: 2017-01-01 | Stop reason: SDUPTHER

## 2017-03-22 RX ORDER — AMLODIPINE BESYLATE 10 MG/1
TABLET ORAL
Qty: 90 TABLET | Refills: 3 | Status: ON HOLD | OUTPATIENT
Start: 2017-03-22 | End: 2017-01-01 | Stop reason: SDUPTHER

## 2017-03-22 RX ORDER — METOPROLOL SUCCINATE 50 MG/1
TABLET, EXTENDED RELEASE ORAL
Qty: 90 TABLET | Refills: 3 | Status: SHIPPED | OUTPATIENT
Start: 2017-03-22 | End: 2017-01-01 | Stop reason: SDUPTHER

## 2017-03-27 DIAGNOSIS — R93.89 ABNORMAL CHEST X-RAY: Primary | ICD-10-CM

## 2017-03-28 RX ORDER — ALBUTEROL SULFATE 90 UG/1
2 AEROSOL, METERED RESPIRATORY (INHALATION) EVERY 4 HOURS PRN
Qty: 6 INHALER | Refills: 3 | Status: SHIPPED | OUTPATIENT
Start: 2017-03-28

## 2017-04-13 ENCOUNTER — HOSPITAL ENCOUNTER (OUTPATIENT)
Dept: RADIATION ONCOLOGY | Facility: HOSPITAL | Age: 72
Setting detail: RADIATION/ONCOLOGY SERIES
End: 2017-04-13

## 2017-04-13 ENCOUNTER — OFFICE VISIT (OUTPATIENT)
Dept: RADIATION ONCOLOGY | Facility: HOSPITAL | Age: 72
End: 2017-04-13

## 2017-04-13 VITALS
SYSTOLIC BLOOD PRESSURE: 120 MMHG | RESPIRATION RATE: 20 BRPM | BODY MASS INDEX: 28.86 KG/M2 | DIASTOLIC BLOOD PRESSURE: 66 MMHG | WEIGHT: 212.8 LBS | HEART RATE: 82 BPM | TEMPERATURE: 97.8 F

## 2017-04-13 DIAGNOSIS — C61 PRIMARY MALIGNANT NEOPLASM OF PROSTATE (HCC): Primary | ICD-10-CM

## 2017-04-13 PROCEDURE — G0463 HOSPITAL OUTPT CLINIC VISIT: HCPCS | Performed by: RADIOLOGY

## 2017-04-13 PROCEDURE — 99213 OFFICE O/P EST LOW 20 MIN: CPT | Performed by: RADIOLOGY

## 2017-04-13 NOTE — PROGRESS NOTES
Patient:  Dru Brownlee  :  1945  Medical Record Number:  [id number]    Encounter Date:  2017      Diagnosis:      ICD-10-CM ICD-9-CM   1. Primary malignant neoplasm of prostate C61 185        Treatment:  780 cGy delivered vi IMrT completed a 2014 O regional PSA of 4. 7 along with 4 months of hormone     Chief Complaint:  Chief Complaint   Patient presents with   • Prostate Cancer   • Follow-up        Interval History:    Patent had a colonoscopy a 4 polyp found 1of them  Showing precanceros cell.  H is also now using oxygen all time since last 3 weeks.  He is not having any dysuria or dribbling and hesitancy of urination.  He has a blood in urine once in his stool exam for Hemoccult was negative.  Shortness of breath is causing some fatigue and weakness.      Medications:  Medication reconciliation for the patient was reviewed and confirmed in the electronic medical record    History of Present Illness : Radiation follow-up     Review of Systems:    Review of Systems   Constitutional: Positive for fatigue.   Respiratory: Positive for shortness of breath.         Home O2  @ 2L/M   Black Lung       CONSTITUTIONAL: Denies any fever, chills or weight loss.     HEENT:  No epistaxis, mouth sores or difficulty swallowing.    RESPIRATORY: Some shortness of breath now require 2 L of oxygen all time because of black lung problem    CARDIOVASCULAR:  No chest pain or palpitations.    GASTROINTESTINAL:  No abdominal pain nausea, vomiting or blood in the stool, diarrhea.    GENITOURINARY:  Denies any hematuria. Dysuria, urgency or nocturia    MUSCULOSKELETAL: No complaint of bony pain,     LYMPHATICS:  Denies any abnormal swollen glands, lumps or bumps anywhere in the body.    Other six system reviewed and are negative.    Physical Exam:     Vitals:    Vitals:    17 1410   BP: 120/66   Pulse: 82   Resp: 20   Temp: 97.8 °F (36.6 °C)     BMI:  Body mass index is 28.86 kg/(m^2).     Patient was  counseled on current BMI.  Patient was encouraged for diet, exercise, and healthy lifestyle.  BSA:  Body surface area is 2.19 meters squared.    Performance Status:  (1) Restricted in physically strenuous activity, ambulatory and able to do work of light nature  ACP: Advanced Care Planning was discussed. The patient does not have a living will documented in the medical record and declined to perform one today.  Smoking Cessation: Counseling given: Yes   his former smoker  Physical Exam      Constitutional:  Well-developed, well-nourished white  male   not in acute distress. Alert and oriented x 3.  Uses oxygen via nasal cannula    HEENT:  Pupils reactive to light, conjunctivae pink. Oral cavity/oropharyngeal exam normal.    Neck:  Supple, no thyromegaly.    Lymphatics:  There is no lymphadenopathy in head, neck, supraclaivicular, axillary, or inguinal area.    Lungs:  Poor respiratory effort and air entry bilaterally, occasional wheezing no crepitation    Heart:  Rate and rhythm regular, no murmur    Abdomen: Soft, without organomegaly, non-tender, non-distended. No other masses palpable in abdomen. Bowel sounds are present.  Rectal exam: Prostate is atrophic, surface movement, no nodularity notice, I was able to reach above prostate.  No blood noticed on my finger.  Extremities:  Without cyanosis or edema, no clubbing.    Spine:  No tenderness    Skin:  Warm, dry, normal texture. No sign of jaundice.    Neurologic:  No focal neurological deficits. Physiologically intact. Normal gait.    Psychologic:  Alert and oriented x 3, good inisight.    Diagnostic Data:    Lab on 01/24/2017   Component Date Value Ref Range Status   • Free T4 01/24/2017 1.57  0.78 - 2.19 ng/dL Final   • TSH 01/24/2017 0.510  0.460 - 4.680 mIU/mL Final   • Glucose 01/24/2017 124* 70 - 100 mg/dL Final   • BUN 01/24/2017 21  8 - 25 mg/dL Final   • Creatinine 01/24/2017 1.00  0.40 - 1.30 mg/dL Final   • Sodium 01/24/2017 138  134 - 146 mmol/L Final    • Potassium 01/24/2017 3.7  3.4 - 5.4 mmol/L Final   • Chloride 01/24/2017 105  100 - 112 mmol/L Final   • CO2 01/24/2017 25.0  20.0 - 32.0 mmol/L Final   • Calcium 01/24/2017 9.4  8.4 - 10.8 mg/dL Final   • Total Protein 01/24/2017 7.5  6.7 - 8.2 g/dL Final   • Albumin 01/24/2017 4.10  3.20 - 5.50 g/dL Final   • ALT (SGPT) 01/24/2017 17  10 - 60 U/L Final   • AST (SGOT) 01/24/2017 16  10 - 60 U/L Final   • Alkaline Phosphatase 01/24/2017 77  15 - 121 U/L Final   • Total Bilirubin 01/24/2017 1.1* 0.2 - 1.0 mg/dL Final   • eGFR Non  Amer 01/24/2017 73  42 - 98 mL/min/1.73 Final   • Globulin 01/24/2017 3.4  2.5 - 4.6 gm/dL Final   • A/G Ratio 01/24/2017 1.2  1.0 - 3.0 g/dL Final   • BUN/Creatinine Ratio 01/24/2017 21.0  7.0 - 25.0 Final   • Anion Gap 01/24/2017 8.0  5.0 - 15.0 mmol/L Final   • Total Cholesterol 01/24/2017 166  150 - 200 mg/dL Final   • Triglycerides 01/24/2017 137  35 - 160 mg/dL Final   • HDL Cholesterol 01/24/2017 40  35 - 100 mg/dL Final   • LDL Cholesterol  01/24/2017 99  mg/dL Final   • VLDL Cholesterol 01/24/2017 27.4  mg/dL Final   • LDL/HDL Ratio 01/24/2017 2.47   Final   • WBC 01/24/2017 6.84  3.20 - 9.80 10*3/mm3 Final   • RBC 01/24/2017 4.35* 4.37 - 5.74 10*6/mm3 Final   • Hemoglobin 01/24/2017 14.5  13.7 - 17.3 g/dL Final   • Hematocrit 01/24/2017 41.9  39.0 - 49.0 % Final   • MCV 01/24/2017 96.3  80.0 - 98.0 fL Final   • MCH 01/24/2017 33.3  26.5 - 34.0 pg Final   • MCHC 01/24/2017 34.6  31.5 - 36.3 g/dL Final   • RDW 01/24/2017 13.6  11.5 - 14.5 % Final   • RDW-SD 01/24/2017 46.4* 35.1 - 43.9 fl Final   • MPV 01/24/2017 10.5  8.0 - 12.0 fL Final   • Platelets 01/24/2017 218  150 - 450 10*3/mm3 Final   • Neutrophil % 01/24/2017 67.5  37.0 - 80.0 % Final   • Lymphocyte % 01/24/2017 20.3  10.0 - 50.0 % Final   • Monocyte % 01/24/2017 10.1  0.0 - 12.0 % Final   • Eosinophil % 01/24/2017 1.5  0.0 - 7.0 % Final   • Basophil % 01/24/2017 0.6  0.0 - 2.0 % Final   • Neutrophils, Absolute  01/24/2017 4.62  2.00 - 8.60 10*3/mm3 Final   • Lymphocytes, Absolute 01/24/2017 1.39  0.60 - 4.20 10*3/mm3 Final   • Monocytes, Absolute 01/24/2017 0.69  0.00 - 0.90 10*3/mm3 Final   • Eosinophils, Absolute 01/24/2017 0.10  0.00 - 0.70 10*3/mm3 Final   • Basophils, Absolute 01/24/2017 0.04  0.00 - 0.20 10*3/mm3 Final        According to the patient last PSA with the Dr. Nunez was normal and he follow with him in 9 months.  We'll try to get that test result for our record    Quality of Life:  90 - Limited Activity    Time Spent Face to Face:  90 % of total 20 minute time spent in counseling, coordination of care, overall prognosis, different treatment options, and outcome result.       Impression:  Oncology Impressions: Cancer view point - disease in remission    Plan:  1.  At patient desire no follow-up appointment given and he wants to follow with Dr. Nunez.  2.  He has been advised to have rectal exam and PSA drawn at least every 6 month.  3.  Patient will call us back if he decided to continue follow with radiation oncologists.    Follow Up:  No Follow-up on file.    Sincerely,    Electronically signed by:    Erlin Wick MD April 13, 2017 2:47 PM     [facility] [place]    CC:  Dr. Nunez

## 2017-06-30 NOTE — ANESTHESIA PREPROCEDURE EVALUATION
Anesthesia Evaluation     Patient summary reviewed and Nursing notes reviewed   NPO Solid Status: > 8 hours       Airway   Mallampati: II  TM distance: >3 FB  Neck ROM: full  possible difficult intubation  Dental    (+) edentulous and upper dentures    Pulmonary    (+) COPD (Stage III Black Lung.), shortness of breath, recent URI resolved, decreased breath sounds, wheezes,     PE comment: Albuterol treatment given pre-op.  Cardiovascular - normal exam    ECG reviewed  PT is on anticoagulation therapy  Patient on routine beta blocker and Beta blocker given within 24 hours of surgery  Rhythm: regular  Rate: normal    (+) hypertension, dysrhythmias (History of Atrial fibrillation;on current exam patient is regular rate and rhythm.) Atrial Fib, CHF, hyperlipidemia    ROS comment: EKG;9/1/16 NSR;History of Atrial fibrillation.    Neuro/Psych- negative ROS  GI/Hepatic/Renal/Endo    (+)  hiatal hernia, GERD well controlled, renal disease stones,     Musculoskeletal     Abdominal    Substance History - negative use     OB/GYN negative ob/gyn ROS         Other   (+) arthritis (Rheumatoid.)   history of cancer (Prostate treated.) remission                                  Anesthesia Plan    ASA 3     general     intravenous induction   Anesthetic plan and risks discussed with patient and spouse/significant other.

## 2017-07-01 NOTE — OP NOTE
CYSTOSCOPY, RETROGRADE PYELOGRAM AND STENT INSERTION  Procedure Note    Dru Brownlee  6/30/2017    Pre-op Diagnosis:   Calculus of ureter [N20.1]    Post-op Diagnosis:     Post-Op Diagnosis Codes:     * Calculus of ureter [N20.1]      Procedure(s):  CYSTOSCOPY, LEFT RETROGRADE PYELOGRAM AND STENT INSERTION    Surgeon(s):  Ken Nunez MD    Anesthesia: General    Staff:   Circulator: Ramila Gaston RN; Bruno Ace RN  Scrub Person: Gerson Arechiga  Assistant: Victorina Grajeda CSA    Estimated Blood Loss: *No blood loss documented*    Specimens:                * No specimens in log *      Drains:           Findings: Large left UPJ stone    Complications: None    Indications: Flank pain hematuria    Description of Procedure: Patient was brought to cystoscopy place in the dorsolithotomy position.  We passed a 22 cystoscope open into the bladder and the left ureteral orifice ease was catheterized.  Put a 6 Bangladeshi ureteral catheter up the ureter 6 cc contrast placed up the ureter filling defect at the UPJ was noted we put an 0.38 guidewire past.  Then over top (scope we placed a 6 x 28 double-J stent push assistant pusher bladder strain scope was removed fluoroscopy showed J stent was in good position    Ken Nunez MD     Date: 6/30/2017  Time: 7:28 PM

## 2017-07-01 NOTE — PLAN OF CARE
Problem: Patient Care Overview (Adult)  Goal: Plan of Care Review  Outcome: Outcome(s) achieved Date Met:  06/30/17  Discharge criteria met

## 2017-07-01 NOTE — PLAN OF CARE
Problem: Patient Care Overview (Adult)  Goal: Plan of Care Review  Outcome: Ongoing (interventions implemented as appropriate)    06/30/17 1951   Coping/Psychosocial Response Interventions   Plan Of Care Reviewed With patient   Patient Care Overview   Progress improving   Outcome Evaluation   Outcome Summary/Follow up Plan VSS, pt meets pacu d/c criteria       Goal: Adult Individualization and Mutuality  Outcome: Ongoing (interventions implemented as appropriate)    Problem: Perioperative Period (Adult)  Goal: Signs and Symptoms of Listed Potential Problems Will be Absent or Manageable (Perioperative Period)  Outcome: Ongoing (interventions implemented as appropriate)

## 2017-07-07 NOTE — OP NOTE
CYSTOSCOPY, URETEROSCOPY, RETROGRADE PYELOGRAM, HOLMIUM LASER, STENT INSERTION  Procedure Note    Dru Brownlee  7/7/2017    Pre-op Diagnosis:   Calculus of kidney [N20.0]    Post-op Diagnosis:     Post-Op Diagnosis Codes:     * Calculus of kidney [N20.0]      Procedure(s):  CYSTOSCOPY, LEFT URETEROSCOPY, RETROGRADE PYELOGRAM, HOLMIUM LASER, STENT INSERTION    Surgeon(s):  Ken Nunez MD    Anesthesia: General    Staff:   Circulator: Heidi Gabriel RN  Scrub Person: Ariel Le; Viviane Alvarez V  Assistant: Victorina Grajeda CSA    Estimated Blood Loss: *No blood loss documented*    Specimens:                  ID Type Source Tests Collected by Time Destination   A : old left stent Tissue Ureter, Left TISSUE EXAM Ken Nunez MD 7/7/2017 1614          Drains:           Findings: Left renal obstruction    Complications: None    Indications: Left flank pain process    Description of Procedure: Patient brought to cystoscopy place in dorsolithotomy position.  I placed a 22 cystoscope open the bladder itself left double-J stent was pulled down put an 0.38 guidewire into the renal pelvis and left inside.  A retrograde catheter over the top of that put 6 cc of contrast in the renal pelvis Position.  Then using the ureteral dilator navigator over the ureteral dilator wire and then to due to an #18 Nepali.  Then I used the sheath dilator placed a flexible ureteroscope in the renal pelvis left inside using a 200 µ fiber with the holmium laser 3.5 power setting continuous pulses fragment the stone in small pieces.  As accomplished.  The debris (scope placed a 8 x 26 double-J stent position was checked with fluoroscopy.  Then brought stent string to the outside shortened it on in the urethra itself with the knot and lateral was drained scope was removed patient taken recovery and tolerated procedure well.    Ken Nunez MD     Date: 7/7/2017  Time: 4:38 PM

## 2017-07-07 NOTE — ANESTHESIA PREPROCEDURE EVALUATION
Anesthesia Evaluation     Patient summary reviewed and Nursing notes reviewed   NPO Solid Status: > 8 hours       Airway   Mallampati: II  TM distance: >3 FB  Neck ROM: full  possible difficult intubation  Dental    (+) edentulous, upper dentures and lower dentures    Pulmonary - normal exam    breath sounds clear to auscultation  (+) shortness of breath, recent URI resolved,   Cardiovascular     ECG reviewed  Patient on routine beta blocker and Beta blocker given within 24 hours of surgery  Rhythm: regular  Rate: normal    (+) hypertension, dysrhythmias (History of atrial fibrillation;EKG 6/30/17 NSR) Atrial Fib, CHF, murmur (Grade II/VI systolic murmur.), hyperlipidemia    ROS comment: EKG:NSR history of atrial fibrillation.    Neuro/Psych- negative ROS  GI/Hepatic/Renal/Endo    (+)  hiatal hernia, GERD well controlled,     Musculoskeletal     Abdominal   (+) obese,    Substance History - negative use     OB/GYN negative ob/gyn ROS         Other   (+) arthritis   history of cancer (Prostate.) remission                                    Anesthesia Plan    ASA 3     general     intravenous induction   Anesthetic plan and risks discussed with patient and spouse/significant other.

## 2017-07-07 NOTE — H&P
UROLOGY [facility] Progress Note  DILLON LIPSCOMB  71-year-old; :1945;;male  [address];[place] [address]  Ins: 1) [facility] HEALTH AND group home FUNDS  MRN:[id number]  OTIS PACHECO MD  [facility] [place]  2017 10:54 AM  Allergies  No Known Allergies Unknown  Current Medications  albuterol sulfate 0.63 mg/3 mL solution for  nebulization solution 1 inhalation  Vitamin B12 500 mcg tablet 1 oral  omeprazole 20 mg oral delayed release capsule  delayed release capsule  pravastatin 40 mg oral tablet  Metoprolol Succinate ER 50 mg oral tablet,  extended release tablet, extended release  amLODIPine 10 mg oral tablet  Eliquis 5 mg oral tablet  * Medications Reconciled  Problem List  Ureteric stone  Medical History  HYPERTENSION  PROSTATE CA 2015  Heart disease  Cataract  Patient reports having had a colonoscopy within  the past 9 years.  Surgical History  HERNIA REPAIR  BACK SURGERY  Nose surgery  RADIATION THERAPY PROSTATE  Psychiatric History  No known pyschiatric history.  Family History  Sister MALRENATO CHELSEA KIDNEY EX RENAL PELVIS  Brother HEART DISEASE  HYPERTENSION  BROTHER/SISTER  Father  COPD Mother   Sister  Sister  Sister   Sister  Brother   Social History  . Retired. Does not drink. Never smoker.  Chief Complaint  Ureteral stone  History of Present Illness  DILLON LIPSCOMB is a 71-year-old male here for evaluation. He has a history of distal  left ureteral stone with J stent in place.  Location: Left ureter.  Severity: Mild.  Onset / Duration: >1 week.  Associated signs and symptoms: No fever.  Review of Systems  Eyes: ; Denies: blurry vision, pain in the eyes and double vision  ENMT: Reports: sinus problems; Denies: ear pain and sore throat  Cardiovascular: ; Denies: chest pain, varicose veins, angina and syncope  Respiratory: Reports: shortness of breath and frequent cough; Denies:  wheezing  Gastrointestinal: ; Denies: abdominal pain, nausea, vomiting, indigestion and  heartburn  Genitourinary: ; Denies: other symptoms (see HPI)  Musculoskeletal: ; Denies: joint pain, neck pain and back pain  Integumentary: ; Denies: skin rash, boils and persistent itch  Neurological: ; Denies: tremors, dizzy spells and numbness / tingling  Psychiatric: Reports: generally satisfied with life; Denies: depression, suicidal  ideation and anxiety  Endocrine: Reports: cold intolerance; Denies: Excessive thirst, heat intolerance and  tired/sluggish  Hematologic/Lymphatic: ; Denies: swollen glands and blood clotting problem  Allergic/Immunologic: ; Denies: hayfever  Constitutional: ; Denies: fever, chills and weight loss  Vital Signs  7/5/2017 10:54:00 AM  Heart Rate: 69 (/min) Weight: 209 (lb)  Resp Rate: 18 (/min) Height: 72 (in)  BMI: 28.35 Never smoker  BP: 120/83 (mmHg)  Exam  General appearance: The patient appears well developed and well nourished, in no  apparent acute distress.  Examination of pupils and irises: No redness or drainage noted.  Assessment of hearing: Responds to verbal command.  Examination of neck: Neck appears supple. No masses noted.  Assessment of respiratory effort: O2 in use.  Examination of gait and station: Normal gait and stature.  Head and neck (Assessment of range of motion): Adequate ROM noted in all  extremities.  Head and neck (Assessment of muscle strength and tone): Muscle strength and tone  normal for age.  Inspection of skin and subcutaneous tissue: Exam of the skin is within normal limits.  The color and skin turgor are normal.  No lesions, bruises, rashes, or jaundice.  Orientation to time, place and person: Oriented to person, place, and time.  Mood and affect: Normal mood and affect.  Data Review  Medications and chart reviewed. History and physical form/ROS reviewed and no  changes noted. Previous encounter reviewed. Last form done 06/30/17.  Lab  Results  06/30/2017  GLUCOSE NEGATIVE, KETONE NEGATIVE, SPECIFIC GRAVITY 1.015, PH 6.5,  BLOOD LARGE, NITRITE NEGATIVE, LEUKOCYTES NEGATIVE, PROTEIN  POSITIVE, CREATININE 300 MG/DL, P:C ABNORMAL  Assessment - Ureteric stone  DX:  Ureteric stone  SNO: [id number], ICD-9: 592.1, ICD-10: N20.1  Assessment Notes:  Distal left ureteral stone.  Plan  Plan Notes:  Cystoscopy, left ureteroscopy, retrograde, laser lithotripsy, stent placement.  Education:  Kidney Stones - English  Discussion:  Discussed my findings and plan of action and reasoning behind decision making. All  questions were answered. FINDINGS WERE DISCUSSED WITH PATIENT. RISKS  AND BENEFITS EXPLAINED. PATIENT WISHES TO PROCEED.  Instructions:  Patient is instructed to call with any problems. Patient is instructed to call if the  condition worsens. Patient is instructed to call with any changes in condition.

## 2017-07-07 NOTE — ANESTHESIA POSTPROCEDURE EVALUATION
Patient: Dru Brownlee    Procedure Summary     Date Anesthesia Start Anesthesia Stop Room / Location    07/07/17 1556 1640 [facility] OR 02 / [facility]       Procedure Diagnosis Surgeon Provider    CYSTOSCOPY, LEFT URETEROSCOPY, RETROGRADE PYELOGRAM, HOLMIUM LASER, STENT INSERTION (Left ) Calculus of kidney  (Calculus of kidney [N20.0]) MD Alix Ridley, AMMY          Anesthesia Type: general  Last vitals  /75 (07/07/17 1639)    Temp 98 °F (36.7 °C) (07/07/17 1639)    Pulse 60 (07/07/17 1639)   Resp 18 (07/07/17 1639)    SpO2 96 % (07/07/17 1639)      Post Anesthesia Care and Evaluation    Patient location during evaluation: PACU  Patient participation: complete - patient participated  Level of consciousness: awake and alert  Pain score: 3  Pain management: adequate  Airway patency: patent  Anesthetic complications: No anesthetic complications  PONV Status: none  Cardiovascular status: acceptable  Respiratory status: acceptable  Hydration status: acceptable

## 2017-07-07 NOTE — PLAN OF CARE
"Problem: Patient Care Overview (Adult)  Goal: Plan of Care Review  Outcome: Ongoing (interventions implemented as appropriate)    07/07/17 7648   Coping/Psychosocial Response Interventions   Plan Of Care Reviewed With patient   Patient Care Overview   Progress improving   Outcome Evaluation   Outcome Summary/Follow up Plan vss, reports low abd pain \"more tolerable\" no penile bleeding, no n/v, ready for discharge to phase 2         Problem: Perioperative Period (Adult)  Goal: Signs and Symptoms of Listed Potential Problems Will be Absent or Manageable (Perioperative Period)  Outcome: Ongoing (interventions implemented as appropriate)      "

## 2017-07-07 NOTE — ANESTHESIA PROCEDURE NOTES
Airway  End Time:7/7/2017 4:05 PM  Airway not difficult    General Information and Staff    Patient location during procedure: OR  CRNA: CASSANDRA BOWDEN    Indications and Patient Condition  Indications for airway management: airway protection    Preoxygenated: yes  MILS maintained throughout  Mask difficulty assessment: 0 - not attempted    Final Airway Details  Final airway type: supraglottic airway      Successful airway: classic  Size 4    Number of attempts at approach: 1

## 2017-07-07 NOTE — ANESTHESIA POSTPROCEDURE EVALUATION
Patient: Dru Brownlee    Procedure Summary     Date Anesthesia Start Anesthesia Stop Room / Location    07/07/17 1556 1640 [facility] / [facility]       Procedure Diagnosis Surgeon Provider    CYSTOSCOPY, LEFT URETEROSCOPY, RETROGRADE PYELOGRAM, HOLMIUM LASER, STENT INSERTION (Left ) Calculus of kidney  (Calculus of kidney [N20.0]) MD Alix Ridley, AMMY          Anesthesia Type: general  Last vitals  /75 (07/07/17 1639)    Temp 98 °F (36.7 °C) (07/07/17 1639)    Pulse 60 (07/07/17 1639)   Resp 18 (07/07/17 1639)    SpO2 96 % (07/07/17 1639)      Post Anesthesia Care and Evaluation    Patient location during evaluation: bedside  Patient participation: complete - patient participated  Level of consciousness: awake  Pain management: adequate  Airway patency: patent  Anesthetic complications: No anesthetic complications    Cardiovascular status: acceptable  Respiratory status: acceptable  Hydration status: acceptable    Comments:

## 2017-07-07 NOTE — PLAN OF CARE
Problem: Patient Care Overview (Adult)  Goal: Plan of Care Review  Outcome: Outcome(s) achieved Date Met:  07/07/17  Discharge criteria met

## 2017-07-27 NOTE — PROGRESS NOTES
Subjective   Dru Brownlee is a 71 y.o. male who presents to the office for follow-up and review of labs.  He is continuing to lose weight.  He had a recent colonoscopy and is still seeing the gastroenterologist.  He is needing referral to pulmonology follow-up on the pulmonary fibrosis related to coworkers pneumoconiosis or black lung.  He was told in the past by the pulmonologist.  Stage III black lung.  He continues to decline as far as overall respiratory status, wearing oxygen continuously now and having very little exercise tolerance    He's having problems with his eyes.  He says he has a clear to white discharge and that they're itchy and raw feeling all the time.    History of Present Illness       This is a chronic problem. The current episode started more than 1 year ago. The problem occurs daily. The problem has been waxing and waning. Associated symptoms include arthralgias, coughing and fatigue. Pertinent negatives include no abdominal pain, anorexia, change in bowel habit, chest pain, chills, congestion, diaphoresis, fever, headaches, joint swelling, myalgias, nausea, neck pain, numbness, rash, sore throat, urinary symptoms, vertigo, visual change, vomiting or weakness. The symptoms are aggravated by exertion. Treatments tried: albuterol, oxygen. The treatment provided mild relief.     The following portions of the patient's history were reviewed and updated as appropriate: allergies, current medications, past family history, past medical history, past social history, past surgical history and problem list.    Review of Systems   Constitutional: Positive for fatigue. Negative for activity change, appetite change, diaphoresis, fever and unexpected weight change.   HENT: Negative for congestion, ear pain, hearing loss, sinus pressure, sore throat, tinnitus, trouble swallowing and voice change.    Eyes: Negative.    Respiratory: Positive for cough, shortness of breath and wheezing.     Cardiovascular: Negative.    Gastrointestinal: Negative for abdominal distention, abdominal pain, blood in stool, constipation, diarrhea, nausea and vomiting.        Recent blood in stool, saw GI   Endocrine: Negative.    Genitourinary: Negative for decreased urine volume, dysuria, frequency, hematuria and urgency.        Recent kidney stone, seeing Dr. Nunez   Musculoskeletal: Positive for arthralgias, joint swelling and myalgias.   Skin: Negative.    Allergic/Immunologic: Negative.    Neurological: Negative for dizziness, tremors, seizures, syncope, speech difficulty, weakness, numbness and headaches.   Hematological: Negative.    Psychiatric/Behavioral: Negative for dysphoric mood and sleep disturbance. The patient is not nervous/anxious.        Objective   Physical Exam   Constitutional: He is oriented to person, place, and time. He appears well-developed and well-nourished. No distress.   HENT:   Head: Normocephalic and atraumatic.   Nose: Nose normal.   Mouth/Throat: Oropharynx is clear and moist.   Eyes: Conjunctivae and EOM are normal. Pupils are equal, round, and reactive to light.   Neck: Normal range of motion. Neck supple. No JVD present. No tracheal deviation present. No thyromegaly present.   Cardiovascular: Normal rate, regular rhythm, normal heart sounds and intact distal pulses.    No murmur heard.  Pulmonary/Chest: No respiratory distress. He has wheezes. He exhibits no tenderness.   Diminished overall   Abdominal: Soft. Bowel sounds are normal. He exhibits no distension and no mass. There is no tenderness.   Musculoskeletal: Normal range of motion. He exhibits no edema or tenderness.   Lymphadenopathy:     He has no cervical adenopathy.   Neurological: He is alert and oriented to person, place, and time. He exhibits normal muscle tone. Coordination normal.   Skin: Skin is warm and dry. No rash noted. No erythema. No pallor.   Psychiatric: He has a normal mood and affect.   Nursing note and  vitals reviewed.      Assessment/Plan   Dru was seen today for follow-up and results.    Diagnoses and all orders for this visit:    Fibrosis of lung  -     Ambulatory Referral to Pulmonology    Allergic conjunctivitis, bilateral    Unexplained weight loss    Essential hypertension    Hyperlipidemia, unspecified hyperlipidemia type    Other orders  -     olopatadine (PATANOL) 0.1 % ophthalmic solution; Administer 1 drop to both eyes 2 (Two) Times a Day.    Will make referral to the [facility]'s clinic for him for follow-up on black lung and pulmonary fibrosis.  He will continue oxygen at 2 L nasal cannula continuously and continue with current inhalers and medications.    He will watch his diet for sugars and carbohydrates as his blood sugar has been creeping up slowly and is 130 fasting this time    The weight loss may be from chronic disease and related to his severe COPD or fibrosis.  He's had recent colonoscopy.  We'll continue following him closely for this    Patanol drops are prescribed for allergic conjunctivitis     Labs are reviewed with patient.    Continue current regimen for hypertension and pravastatin for hyperlipidemia and recheck in 6 months    Lab on 07/25/2017   Component Date Value Ref Range Status   • Total Cholesterol 07/25/2017 158  150 - 200 mg/dL Final   • Triglycerides 07/25/2017 135  35 - 160 mg/dL Final   • HDL Cholesterol 07/25/2017 37  35 - 100 mg/dL Final   • LDL Cholesterol  07/25/2017 94  mg/dL Final   • VLDL Cholesterol 07/25/2017 27  mg/dL Final   • LDL/HDL Ratio 07/25/2017 2.54   Final   • Glucose 07/25/2017 130* 70 - 100 mg/dL Final   • BUN 07/25/2017 15  8 - 25 mg/dL Final   • Creatinine 07/25/2017 1.10  0.40 - 1.30 mg/dL Final   • Sodium 07/25/2017 140  134 - 146 mmol/L Final   • Potassium 07/25/2017 3.8  3.4 - 5.4 mmol/L Final   • Chloride 07/25/2017 103  100 - 112 mmol/L Final   • CO2 07/25/2017 26.0  20.0 - 32.0 mmol/L Final   • Calcium 07/25/2017 9.0  8.4 - 10.8 mg/dL  Final   • Total Protein 07/25/2017 7.5  6.7 - 8.2 g/dL Final   • Albumin 07/25/2017 3.80  3.20 - 5.50 g/dL Final   • ALT (SGPT) 07/25/2017 15  10 - 60 U/L Final   • AST (SGOT) 07/25/2017 20  10 - 60 U/L Final   • Alkaline Phosphatase 07/25/2017 86  15 - 121 U/L Final   • Total Bilirubin 07/25/2017 1.0  0.2 - 1.0 mg/dL Final   • eGFR Non African Amer 07/25/2017 66  42 - 98 mL/min/1.73 Final   • Globulin 07/25/2017 3.7  2.5 - 4.6 gm/dL Final   • A/G Ratio 07/25/2017 1.0  1.0 - 3.0 g/dL Final   • BUN/Creatinine Ratio 07/25/2017 13.6  7.0 - 25.0 Final   • Anion Gap 07/25/2017 11.0  5.0 - 15.0 mmol/L Final   • TSH 07/25/2017 0.340* 0.460 - 4.680 mIU/mL Final   • Free T4 07/25/2017 1.68  0.78 - 2.19 ng/dL Final   • WBC 07/25/2017 7.02  3.20 - 9.80 10*3/mm3 Final   • RBC 07/25/2017 4.15* 4.37 - 5.74 10*6/mm3 Final   • Hemoglobin 07/25/2017 13.4* 13.7 - 17.3 g/dL Final   • Hematocrit 07/25/2017 39.7  39.0 - 49.0 % Final   • MCV 07/25/2017 95.7  80.0 - 98.0 fL Final   • MCH 07/25/2017 32.3  26.5 - 34.0 pg Final   • MCHC 07/25/2017 33.8  31.5 - 36.3 g/dL Final   • RDW 07/25/2017 12.9  11.5 - 14.5 % Final   • RDW-SD 07/25/2017 43.8  35.1 - 43.9 fl Final   • MPV 07/25/2017 9.6  8.0 - 12.0 fL Final   • Platelets 07/25/2017 217  150 - 450 10*3/mm3 Final   • Neutrophil % 07/25/2017 71.2  37.0 - 80.0 % Final   • Lymphocyte % 07/25/2017 17.1  10.0 - 50.0 % Final   • Monocyte % 07/25/2017 8.7  0.0 - 12.0 % Final   • Eosinophil % 07/25/2017 2.6  0.0 - 7.0 % Final   • Basophil % 07/25/2017 0.4  0.0 - 2.0 % Final   • Neutrophils, Absolute 07/25/2017 5.00  2.00 - 8.60 10*3/mm3 Final   • Lymphocytes, Absolute 07/25/2017 1.20  0.60 - 4.20 10*3/mm3 Final   • Monocytes, Absolute 07/25/2017 0.61  0.00 - 0.90 10*3/mm3 Final   • Eosinophils, Absolute 07/25/2017 0.18  0.00 - 0.70 10*3/mm3 Final   • Basophils, Absolute 07/25/2017 0.03  0.00 - 0.20 10*3/mm3 Final   Admission on 07/07/2017, Discharged on 07/07/2017   Component Date Value Ref Range  Status   • Color, UA 07/07/2017 Red* Yellow, Straw, Dark Yellow, Deanne Final   • Appearance, UA 07/07/2017 Turbid* Clear Final   • pH, UA 07/07/2017 5.5  5.0 - 9.0 Final   • Specific Gravity, UA 07/07/2017 1.022  1.003 - 1.030 Final   • Glucose, UA 07/07/2017 Negative  Negative Final   • Ketones, UA 07/07/2017 15 mg/dL (1+)* Negative Final   • Bilirubin, UA 07/07/2017 Large (3+)* Negative Final   • Blood, UA 07/07/2017 Large (3+)* Negative Final   • Protein, UA 07/07/2017 >=300 mg/dL (3+)* Negative Final   • Leuk Esterase, UA 07/07/2017 Large (3+)* Negative Final   • Nitrite, UA 07/07/2017 Positive* Negative Final   • Urobilinogen, UA 07/07/2017 1.0 E.U./dL  0.2 - 1.0 E.U./dL Final   • Case Report 07/07/2017    Final                    Value:Surgical Pathology Report                         Case: [id number]                                  Authorizing Provider:  Ken Nunez MD        Collected:           07/07/2017 04:14 PM          Ordering Location:     [facility]             Received:            07/10/2017 08:23 AM                                 [place]                                                              Pathologist:           Ashok Oleary MD                                                         Specimen:    Ureter, Left, 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   • Gross Description 07/07/2017    Final                    Value:This result contains rich text formatting which cannot be displayed here.   Lab on 06/29/2017   Component Date Value Ref Range Status   • Color, UA 06/29/2017 Red* Yellow, Straw Final   • Appearance, UA 06/29/2017 Turbid* Clear Final   • pH, UA 06/29/2017 7.0  5.5 - 8.0 Final   • Specific Gravity, UA 06/29/2017 1.020  1.005 - 1.030 Final   • Glucose, UA 06/29/2017 Negative  Negative  Final   • Ketones, UA 06/29/2017 Negative  Negative Final   • Bilirubin, UA 06/29/2017 Small (1+)* Negative Final   • Blood, UA 06/29/2017 Large (3+)* Negative Final   • Protein, UA 06/29/2017 100 mg/dL (2+)* Negative Final   • Leuk Esterase, UA 06/29/2017 Negative  Negative Final   • Nitrite, UA 06/29/2017 Positive* Negative Final   • Urobilinogen, UA 06/29/2017 2.0 E.U./dL* 0.2 - 1.0 E.U./dL Final   • WBC 06/29/2017 6.89  3.20 - 9.80 10*3/mm3 Final   • RBC 06/29/2017 4.25* 4.37 - 5.74 10*6/mm3 Final   • Hemoglobin 06/29/2017 13.7  13.7 - 17.3 g/dL Final   • Hematocrit 06/29/2017 40.8  39.0 - 49.0 % Final   • MCV 06/29/2017 96.0  80.0 - 98.0 fL Final   • MCH 06/29/2017 32.2  26.5 - 34.0 pg Final   • MCHC 06/29/2017 33.6  31.5 - 36.3 g/dL Final   • RDW 06/29/2017 13.7  11.5 - 14.5 % Final   • RDW-SD 06/29/2017 45.9* 35.1 - 43.9 fl Final   • MPV 06/29/2017 9.8  8.0 - 12.0 fL Final   • Platelets 06/29/2017 215  150 - 450 10*3/mm3 Final   • Neutrophil % 06/29/2017 68.9  37.0 - 80.0 % Final   • Lymphocyte % 06/29/2017 19.3  10.0 - 50.0 % Final   • Monocyte % 06/29/2017 9.1  0.0 - 12.0 % Final   • Eosinophil % 06/29/2017 2.0  0.0 - 7.0 % Final   • Basophil % 06/29/2017 0.7  0.0 - 2.0 % Final   • Neutrophils, Absolute 06/29/2017 4.74  2.00 - 8.60 10*3/mm3 Final   • Lymphocytes, Absolute 06/29/2017 1.33  0.60 - 4.20 10*3/mm3 Final   • Monocytes, Absolute 06/29/2017 0.63  0.00 - 0.90 10*3/mm3 Final   • Eosinophils, Absolute 06/29/2017 0.14  0.00 - 0.70 10*3/mm3 Final   • Basophils, Absolute 06/29/2017 0.05  0.00 - 0.20 10*3/mm3 Final   • RBC, UA 06/29/2017 Too Numerous to Count* None Seen /HPF Final   • WBC, UA 06/29/2017 None Seen  None Seen /HPF Final   • Bacteria, UA 06/29/2017 None Seen  None Seen /HPF Final   • Squamous Epithelial Cells, UA 06/29/2017 None Seen  None Seen, 0-2 /HPF Final   • Hyaline Casts, UA 06/29/2017 None Seen  None Seen /LPF Final   • Methodology 06/29/2017 Manual Light Microscopy   Final   • Urine  Culture 06/29/2017 No growth at 24 hours   Final   ]

## 2017-09-01 NOTE — PROGRESS NOTES
Subjective   Dru Brownlee is a 71 y.o. male who presents to the office complaining of sinus problems.   History of Present Illness     Patient states that he has had some sinus congestion for little over a week, but the past 3 days it has gotten a lot worse.  He is experiencing maxillary sinus pressure, headache that is worse when he bends forward, chills last night, and possible fever (patient thought he had a fever but did not take his temperature).  Patient states that his chest tightness and shortness of breath are worse in comparison to they normally are, patient has history of black lung stage III and is currently on 1 L of oxygen daily.  Patient states that cough is productive but clear.  Patient also states that he is having some ear pain specifically on the right side.  Patient states that he is extremely tired and is eating and drinking well but does not have appetite.  Patient has history of pneumonia and states that this episode is similar to the start of when he had pneumonia in the past.    The following portions of the patient's history were reviewed and updated as appropriate: allergies, current medications, past family history, past medical history, past social history, past surgical history and problem list.    Review of Systems   Constitutional: Positive for activity change, chills, fatigue and fever. Negative for appetite change and diaphoresis.   HENT: Positive for congestion, ear pain, postnasal drip (lots of postnasal drainage at nighttime lots of coughing at nighttime that is making patient a little nauseous) and sinus pressure. Negative for ear discharge, facial swelling, hearing loss, rhinorrhea, sneezing, sore throat, tinnitus and trouble swallowing.    Eyes: Negative for photophobia, pain, discharge, redness, itching and visual disturbance.   Respiratory: Positive for chest tightness, shortness of breath and wheezing. Negative for apnea and choking.    Cardiovascular: Negative  "for chest pain and palpitations.   Gastrointestinal: Positive for nausea. Negative for abdominal distention, abdominal pain, diarrhea and vomiting.   Genitourinary: Negative for difficulty urinating.   Musculoskeletal: Positive for myalgias.   Neurological: Positive for headaches. Negative for dizziness, weakness and light-headedness.       Objective   /70 (BP Location: Right arm, Patient Position: Sitting, Cuff Size: Adult)  Pulse 74  Temp 98.3 °F (36.8 °C) (Oral)   Resp 18  Ht 72\" (182.9 cm)  Wt 210 lb (95.3 kg)  SpO2 97% Comment: ltr 2  BMI 28.48 kg/m2  Physical Exam   Constitutional: He appears well-developed and well-nourished. He appears ill.   HENT:   Head: Normocephalic.   Right Ear: Hearing, external ear and ear canal normal. No drainage. Tympanic membrane is bulging. Tympanic membrane is not injected, not erythematous and not retracted. No decreased hearing is noted.   Left Ear: Hearing, external ear and ear canal normal. No drainage. Tympanic membrane is erythematous and bulging. Tympanic membrane is not injected and not retracted. No decreased hearing is noted.   Nose: Mucosal edema and sinus tenderness present. No rhinorrhea. Right sinus exhibits maxillary sinus tenderness. Right sinus exhibits no frontal sinus tenderness. Left sinus exhibits maxillary sinus tenderness. Left sinus exhibits no frontal sinus tenderness.   Mouth/Throat: Uvula is midline and mucous membranes are normal. Oropharyngeal exudate present.   Eyes: Conjunctivae are normal. Pupils are equal, round, and reactive to light.   Cardiovascular: Normal rate, regular rhythm, normal heart sounds and intact distal pulses.  Exam reveals no gallop and no friction rub.    No murmur heard.  Pulmonary/Chest: Effort normal. No respiratory distress. He has decreased breath sounds. He has no wheezes. He has no rhonchi. He has no rales.   Abdominal: Soft. Bowel sounds are normal. He exhibits no distension and no mass. There is no " tenderness. There is no rebound and no guarding. No hernia.   Neurological: He is alert.   Psychiatric: He has a normal mood and affect. His behavior is normal.   Nursing note and vitals reviewed.       Assessment/Plan   Dru was seen today for sinusitis.    Diagnoses and all orders for this visit:    Acute recurrent maxillary sinusitis  -     guaiFENesin (MUCINEX) 600 MG 12 hr tablet; Take 2 tablets by mouth 2 (Two) Times a Day.  -     fluticasone (FLONASE) 50 MCG/ACT nasal spray; 2 sprays into each nostril Daily. Administer 2 sprays in each nostril for each dose.    Seasonal allergic rhinitis due to pollen  -     guaiFENesin (MUCINEX) 600 MG 12 hr tablet; Take 2 tablets by mouth 2 (Two) Times a Day.  -     fluticasone (FLONASE) 50 MCG/ACT nasal spray; 2 sprays into each nostril Daily. Administer 2 sprays in each nostril for each dose.    Bilateral pulmonary infiltrates on CXR  -     doxycycline (VIBRAMYCIN) 100 MG capsule; Take 1 capsule by mouth 2 (Two) Times a Day for 7 days.  -     XR Chest 2 View; Future    Left otitis media, unspecified chronicity, unspecified otitis media type    Cough  -     XR Chest 2 View  -     promethazine-dextromethorphan (PROMETHAZINE-DM) 6.25-15 MG/5ML syrup; Take 5 mL by mouth At Night As Needed (cough and nausea). Do not drive with this medication    Other orders  -     Cancel: amoxicillin-clavulanate (AUGMENTIN) 875-125 MG per tablet; Take 1 tablet by mouth 2 (Two) Times a Day for 10 days. Take every single dose of this antibiotic course.    Patient is experiencing maxillary sinusitis, a left ear infection, and seasonal allergies.  Will prescribe antibiotic but going to order stat chest x-ray first to rule out pneumonia since patient states that he had a fever last night, is on oxygen, and has a history of black lung stage III and recurrent pneumonia.  Will call patient and notify him once chest x-ray results come in today.  Prescribed patient on Mucinex and Flonase.  Cough is  severe and keeping patient up at night, prescribed Phenergan DM at bedtime, advised patient not to drive on and only take at nighttime.  Patient also educated to use albuterol inhaler four times a day until shortness of breath, chest tightness, and cough improves.  Patient also think encouraged to rest and drink plenty of fluids.    Stat chest x-ray done today was obtained and reviewed.  Impression of chest x-ray shows mild interstitial prominence lung fields bilaterally consistent with interstitial infiltrate or edema.  Although pneumonia is not apparent with this chest x-ray it does show changes from last chest x-ray done in March 2017, will empirically treat patient for pneumonia with doxycycline since Levaquin or Augmentin plus Zithromax interfere with patient's amiodarone that he is currently taking.  Patient will follow-up in one week for repeat chest x-ray, follow-up appointment will be scheduled if chest x-ray is not normal and/or if patient's condition has not improved.  Will call patient and notify.     Called patient around 4 PM today to discuss chest x-ray results, explained to the patient the possibility of underlying pneumonia and the need to change antibiotic to cover both the sinusitis, ear infection, and possible pneumonia.  Patient asked to  these prescriptions at the pharmacy and to go to the emergency room if condition worsens.  Told patient that I would like to do a repeat chest x-ray next week on Thursday or Friday, with a follow-up appointment if the patient is not feeling better or there is something wrong with the chest x-ray.  Patient is agreeable with plan of care.      MEY Key        This document has been electronically signed by MEY Key on September 1, 2017 5:03 PM

## 2017-09-07 NOTE — TELEPHONE ENCOUNTER
----- Message from MEY Key sent at 9/7/2017  1:22 PM CDT -----  Please let patient know that CXR today in comparison to the one last week shows no interstitial infiltrates or edema like it did in the chest xray last week. Which means if he did have a little pneumonia hiding behind his chronic black lung, it looks like it has cleared up. But will you please ask him how he is feeling and let me know as far as shortness of breath, fever, chest tightness, and wheezing go. Thanks

## 2017-09-07 NOTE — TELEPHONE ENCOUNTER
Called pt spoke to wife and she said he was doing better from the neck down its just all in his head now I encouraged her to come back see carleen we could get them in and out.    I gave the wife the results from below

## 2017-09-12 PROBLEM — N20.1 CALCULI, URETER: Status: ACTIVE | Noted: 2017-01-01

## 2017-09-12 NOTE — DISCHARGE INSTRUCTIONS
[facility]  Pre-op Information and Guidelines    You will be called after 2 p.m. the day before your surgery (Friday for Monday surgery) and notified of your time for arrival and approximate surgery time.  If you have not received a call by 4P.M., please contact Same Day Surgery at [phone] of if outside [facility] call [phone].    Please Follow these Important Safety Guidelines:    • The morning of your procedure, take only the medications listed below with   A sip of water:_INHALER, FLONASE, AMLODIPINE, AMIODARONE_____       OMEPRAZOLE, METOPROLOL, MUCINEX_____________    • DO NOT eat or drink anything after 12:00 midnight the night before surgery  Specific instructions concerning drinking clear liquids will be discussed during  the pre-surgery instruction call the day before your surgery.    • If you take a blood thinner (ex. Plavix, Coumadin, aspirin), ask your doctor when to stop it before surgery  STOP DATE: _________________    • Only 2 visitors are allowed in patient rooms at a time  Your visitors will be asked to wait in the lobby until the admission process is complete with the exception of a parent with a child and patients in need of special assistance.    • YOU CANNOT DRIVE YOURSELF HOME  You must be accompanied by someone who will be responsible for driving you home after surgery and for your care at home.    • DO NOT chew gum, use breath mints, hard candy, or smoke the day of surgery  • DO NOT drink alcohol for at least 24 hours before your surgery  • DO NOT wear any jewelry and remove all body piercing before coming to the hospital  • DO NOT wear make-up to the hospital  • If you are having surgery on an extremity (arm/leg/foot) remove nail polish/artificial nails on the surgical side  • Clothing, glasses, contacts, dentures, and hairpieces must be removed before surgery  • Bathe the night before or the morning of your surgery and do not use powders/lotions on  skin.

## 2017-09-13 NOTE — H&P
UROLOGY [facility] History and Physical  DILLON LIPSCOMB  71-year-old; :1945;;male  [address];[place] [address]  Ins: 1) [facility] AND [facility]  MRN:[id number]  OTIS PACHECO MD  UROLOGY [facility]  Sep. 12, 2017 11:48 AM  Allergies  No Known Allergies Unknown  Current Medications  albuterol sulfate 0.63 mg/3 mL solution for  nebulization solution 1 inhalation  Vitamin B12 500 mcg tablet 1 oral  omeprazole 20 mg oral delayed release capsule  delayed release capsule  pravastatin 40 mg oral tablet  Metoprolol Succinate ER 50 mg oral tablet,  extended release tablet, extended release  amLODIPine 10 mg oral tablet  Eliquis 5 mg oral tablet  tamsulosin 0.4 mg oral capsule 1 capsule oral  at bedtime  Problem List  Ureteric stone  Medical History  HYPERTENSION  PROSTATE CA 2015  Heart disease  Cataract  Has not had colonoscopy.  Surgical History  HERNIA REPAIR  BACK SURGERY  Nose surgery  RADIATION THERAPY PROSTATE  REMOVAL OF STENT W/O CYSTO 2017  Psychiatric History  No known pyschiatric history.  Family History  Sister PATRICIA CHELSEA KIDNEY EX RENAL PELVIS  Brother HEART DISEASE  HYPERTENSION  BROTHER/SISTER  Father  COPD Mother   Sister  Sister  Sister   Sister  Brother   Social History  . Retired. Does not drink. Never smoker.  Imm/Inj/Office Meds History Comments  Has not had pneumonia vaccine.  Chief Complaint  Ureteral Calculi  History of Present Illness  DILLON LIPSCOMB is a 71-year-old male here for evaluation. He has a history of LEFT  ureteral calculi status post LEFT CRULLS on 17. Today KUB shows left upper  ureteric stone unchanged in position at L2-3 disc level.  On 17: KUB read: 4x7 mm stone upper left ureter at L2-3 disc level and renal  ultrasound showed minimal hydronephrosis left kidney, small renal cysts bilaterally,  stone neck of gallbladder and 1.1 cm cyst  pancreatic head.  He states that he is pain free, no hematuria, no UTI symptoms. He does want  lithotripsy soon though because he is tired of dealing with the same stone and he  doesn't want problems from it in the future. Still taking Flomax, drinking plenty of fluids.  Location: LEFT ureter  Severity: mild  Onset / Duration: 2 months  Timing: intermittent  Modifying factors: LEFT CRULLS  Associated signs and symptoms: asymptomatic  Review of Systems  Eyes: ; Denies: blurry vision, pain in the eyes and double vision  ENMT: ; Denies: ear pain, sore throat and sinus problems  Cardiovascular: ; Denies: chest pain, varicose veins, angina and syncope  Respiratory: Reports: shortness of breath and frequent cough; Denies: wheezing  Gastrointestinal: ; Denies: abdominal pain, nausea, vomiting, indigestion and  heartburn  Genitourinary: ; Denies: other symptoms (see HPI)  Musculoskeletal: ; Denies: joint pain, neck pain and back pain  Integumentary: ; Denies: skin rash, boils and persistent itch  Neurological: ; Denies: tremors, dizzy spells and numbness / tingling  Psychiatric: Reports: generally satisfied with life; Denies: depression, suicidal  ideation and anxiety  Endocrine: Reports: cold intolerance; Denies: Excessive thirst, heat intolerance and  tired/sluggish  Hematologic/Lymphatic: ; Denies: swollen glands and blood clotting problem  Allergic/Immunologic: ; Denies: hayfever  Constitutional: ; Denies: fever, chills and weight loss  Vital Signs  9/12/2017 11:48:00 AM  Weight: 209 (lb) Resp Rate: 18 (/min)  Height: 72 (in) BMI: 28.35  Never smoker  Exam  General appearance: The patient appears well developed and well nourished, in no  apparent acute distress.  Examination of pupils and irises: Normal.  Assessment of hearing: Normal.  Examination of neck: Neck appears supple.  Assessment of respiratory effort: Respiratory effort appears normal. No apparent  distress.  Examination of Extremities for edema and/or  varicosities: none  Inspection of breasts: Deferred.  Examination of abdomen: Soft benign abdomen on exam.  Obtain stool sample: Stool specimen for occult blood is not indicated.  Examination of gait and station: Normal.  Head and neck (Assessment of range of motion): Adequate ROM noted in all  extremities.  Head and neck (Assessment of stability): Ambulates without assistance.  Orientation to time, place and person: Oriented to person, place, and time.  Mood and affect: Normal mood and affect.  Data Review  Medications and chart reviewed. History and physical form/ROS reviewed and no  changes noted. Previous encounter reviewed. Last form done 06/30/17. KUB ordered  and reviewed by PROVIDER today.  Assessment - Ureteric stone, NOS  DX:  Ureteric stone, NOS  SNO: [id number], ICD-9: 592.1, ICD-10: N20.1  Assessment Notes:  no change in 4x7 mm left upper ureteric stone  Plan  Plan Notes:  Cystoscopy, left retrograde, ureteroscopy, laser lithotripsy, stent.   Continue fluids and Flomax.  CC: DR. NAVA PLEASE  NEEDS ULTRASOUND IN 4 MONTHS RE: GALLBLADDER AND PACREATIC  HEAD CYST, SEND 8/29/17 RENAL ULTRASOUND TO HIS PCP DR. NAVA  Education:  Kidney Stones - English  Discussion:  Discussed my findings and plan of action and reasoning behind decision making. All  questions were answered. FINDINGS WERE DISCUSSED WITH PATIENT. RISKS  AND BENEFITS EXPLAINED. PATIENT WISHES TO PROCEED.  Instructions:  Patient is instructed to call with any problems. Patient is instructed to call if the  condition worsens. Patient is instructed to call with any changes in condition. Patient  is instructed to call if he has any intractable pain, fever, chills, nausea, or vomiting.  INCREASE FLUIDS. IF PAIN WORSENS/ UNCONTROLLED OR TEMPERATURE  >101.0 GO IMMEDIATELY TO ER.

## 2017-09-15 NOTE — ANESTHESIA PROCEDURE NOTES
Airway  Urgency: elective    Airway not difficult    General Information and Staff    Patient location during procedure: OR  CRNA: KASSIE KING    Indications and Patient Condition  Indications for airway management: airway protection    Preoxygenated: yes  MILS not maintained throughout  Mask difficulty assessment: 1 - vent by mask    Final Airway Details  Final airway type: supraglottic airway      Successful airway: classic  Size 4    Number of attempts at approach: 1

## 2017-09-15 NOTE — PLAN OF CARE
Problem: Patient Care Overview (Adult)  Goal: Plan of Care Review    09/15/17 1745   Coping/Psychosocial Response Interventions   Plan Of Care Reviewed With patient   Patient Care Overview   Progress improving   Outcome Evaluation   Outcome Summary/Follow up Plan vss. no distress noted. denies acute pain. 02 @2l/m       Goal: Adult Individualization and Mutuality  Outcome: Ongoing (interventions implemented as appropriate)    Problem: Perioperative Period (Adult)  Goal: Signs and Symptoms of Listed Potential Problems Will be Absent or Manageable (Perioperative Period)  Outcome: Ongoing (interventions implemented as appropriate)

## 2017-09-15 NOTE — OP NOTE
CYSTOSCOPY, URETEROSCOPY, RETROGRADE PYELOGRAM, HOLMIUM LASER, STENT INSERTION  Procedure Note    Dru Brownlee  9/15/2017    Pre-op Diagnosis:   Calculi, ureter [N20.1]    Post-op Diagnosis:     Post-Op Diagnosis Codes:     * Calculi, ureter [N20.1]      Procedure(s):  CYSTOSCOPY, LEFT URETEROSCOPY, RETROGRADE PYELOGRAM, HOLMIUM LASER, STENT INSERTION    Surgeon(s):  Ken Nunez MD    Anesthesia: General    Staff:   Circulator: Bhavya Lang RN  Scrub Person: Ariel Le  Assistant: Victorina Grajeda CSA    Estimated Blood Loss: *No blood loss documented*    Specimens:                * No specimens in log *      Drains:           Findings: Proximal left ureteral stone 8 mm    Complications: None    Indications: Left flank pain hematuria hydronephrosis    Description of Procedure: Brought to cystoscopy and placed in the dorsolithotomy position.  I passed a 22 cystoscope open the bladder and left ureter was catheterized with a 6 [place] ureteral catheter.  Put 6 cc contrast up the ureter and proximal left ureteral stone was noted put an 0.38 guidewire past the stone into the renal pelvis and I dilated the ureter to an 18 [place] with the navigator system I was unable to place the rigid scope up the ureter.  Then put the flexible ureteroscope over the top the guidewire into the ureter proximal on the left inside engaged the stone.  With a to her micron fiber prior setting 3.5 continuous pulses are fragmented the stone in small pieces pushing it on up into the renal pelvis and  it out.  I put a 0.38 guidewire back in the renal pelvis and then removed the scope and over the top (scope placed a 8 x 26 double-J stent bladder strain scope was removed the patient taken recovery and procedure well    Ken Nunez MD     Date: 9/15/2017  Time: 5:29 PM

## 2017-09-15 NOTE — ANESTHESIA PREPROCEDURE EVALUATION
Anesthesia Evaluation     NPO Solid Status: > 8 hours  NPO Liquid Status: > 8 hours     Airway   Mallampati: II  TM distance: >3 FB  no difficulty expected  Dental    (+) poor dentition    Pulmonary    (+) a smoker Former, COPD mild, shortness of breath, recent URI resolved, rhonchi,   Cardiovascular     Rhythm: regular  Rate: normal    (+) hypertension well controlled, dysrhythmias Atrial Fib, CHF, hyperlipidemia      Neuro/Psych  GI/Hepatic/Renal/Endo    (+)  renal disease stones,     Musculoskeletal     Abdominal     Abdomen: soft.   Substance History      OB/GYN          Other   (+) arthritis   history of cancer (Prostate cancer) active                                    Anesthesia Plan    ASA 3     general     intravenous induction   Anesthetic plan and risks discussed with patient.

## 2017-09-16 NOTE — ANESTHESIA POSTPROCEDURE EVALUATION
"Patient: Dru Brownlee    Procedure Summary     Date Anesthesia Start Anesthesia Stop Room / Location    09/15/17 [id number]  [facility] OR 02 / [facility]       Procedure Diagnosis Surgeon Provider    CYSTOSCOPY, LEFT URETEROSCOPY, RETROGRADE PYELOGRAM, HOLMIUM LASER, STENT INSERTION (Left ) Calculi, ureter  (Calculi, ureter [N20.1]) MD Gomez Ridley MD          Anesthesia Type: general  Last vitals  BP        Temp        Pulse       Resp        SpO2          Post Anesthesia Care and Evaluation    Patient location during evaluation: bedside  Patient participation: complete - patient participated  Level of consciousness: awake  Pain management: adequate  Airway patency: patent  Anesthetic complications: No anesthetic complications    Cardiovascular status: acceptable  Respiratory status: acceptable  Hydration status: acceptable    Comments: /66  Pulse 65  Temp 96.7 °F (35.9 °C)  Resp 18  Ht 72\" (182.9 cm)  Wt 208 lb 5.4 oz (94.5 kg)  SpO2 95%  BMI 28.26 kg/m2    No anesthesia care post op    "

## 2017-10-24 PROBLEM — J44.9 COPD (CHRONIC OBSTRUCTIVE PULMONARY DISEASE) (HCC): Status: ACTIVE | Noted: 2017-01-01

## 2017-10-24 NOTE — PROGRESS NOTES
Subjective   Dru Brownlee is a 71 y.o. male with chronic fibrotic lung disease who presents to the office for difficulty breathing for the past several days.  He's had head congestion with cough sneezing earache and sore throat and he has a pulse oximeter at home and his oxygen has been running in the 70s at times.  He's had vigorous coughing.  Cough is usually nonproductive    History of Present Illness   COPD   This is a chronic problem. The current episode started more than 1 year ago. The problem occurs daily. The problem has been getting worse.  Associated symptoms include arthralgias, coughing and fatigue. Pertinent negatives include no abdominal pain, anorexia, change in bowel habit, chest pain, chills, congestion, diaphoresis, fever, headaches, joint swelling, myalgias, nausea, neck pain, numbness, rash, sore throat, urinary symptoms, vertigo, visual change, vomiting or weakness. The symptoms are aggravated by exertion. Treatments tried: albuterol. The treatment provided mild relief.     The following portions of the patient's history were reviewed and updated as appropriate: allergies, current medications, past family history, past medical history, past social history, past surgical history and problem list.    Review of Systems   Constitutional: Positive for activity change, chills, fatigue and fever. Negative for appetite change and diaphoresis.   HENT: Positive for congestion, ear pain, postnasal drip (lots of postnasal drainage at nighttime lots of coughing at nighttime that is making patient a little nauseous) and sinus pressure. Negative for ear discharge, facial swelling, hearing loss, rhinorrhea, sneezing, sore throat, tinnitus and trouble swallowing.    Eyes: Negative for photophobia, pain, discharge, redness, itching and visual disturbance.   Respiratory: Positive for chest tightness, shortness of breath and wheezing. Negative for apnea and choking.    Cardiovascular: Negative for chest  pain and palpitations.   Gastrointestinal: Positive for nausea. Negative for abdominal distention, abdominal pain, diarrhea and vomiting.   Genitourinary: Negative for difficulty urinating.   Musculoskeletal: Positive for myalgias.   Neurological: Positive for headaches. Negative for dizziness, weakness and light-headedness.   All other systems reviewed and are negative.      Objective   Physical Exam   Constitutional: He is oriented to person, place, and time. He appears well-developed and well-nourished. No distress.   HENT:   Head: Normocephalic and atraumatic.   Nose: Nose normal.   Mouth/Throat: Oropharynx is clear and moist.   Eyes: Conjunctivae and EOM are normal. Pupils are equal, round, and reactive to light.   Neck: Normal range of motion. Neck supple. No JVD present. No tracheal deviation present. No thyromegaly present.   Cardiovascular: Normal rate, regular rhythm, normal heart sounds and intact distal pulses.    No murmur heard.  Pulmonary/Chest: No respiratory distress. He has wheezes. He exhibits no tenderness.   Diminished overall, left basilar Rales   Abdominal: Soft. Bowel sounds are normal. He exhibits no distension and no mass. There is no tenderness.   Musculoskeletal: Normal range of motion. He exhibits no edema or tenderness.   Lymphadenopathy:     He has no cervical adenopathy.   Neurological: He is alert and oriented to person, place, and time. He exhibits normal muscle tone. Coordination normal.   Skin: Skin is warm and dry. No rash noted. No erythema. No pallor.   Psychiatric: He has a normal mood and affect.   Nursing note and vitals reviewed.      Assessment/Plan   Dru was seen today for cough.    Diagnoses and all orders for this visit:    Cough  -     XR Chest 2 View    Fibrosis of lung    Chronic obstructive pulmonary disease with acute exacerbation    Other orders  -     cefuroxime (CEFTIN) 500 MG tablet; Take 1 tablet by mouth 2 (Two) Times a Day.  -     albuterol (PROVENTIL)  (2.5 MG/3ML) 0.083% nebulizer solution; Take 2.5 mg by nebulization Every 4 (Four) Hours As Needed for Wheezing.  -     guaifenesin-dextromethorphan (MUCINEX DM)  MG tablet sustained-release 12 hour tablet; Take 1 tablet by mouth 2 (Two) Times a Day As Needed (cough).    Chest x-ray shows no active disease but he does have chronic fibrotic changes    Date Mucinex DM to help make the cough more productive, Ceftin for acute COPD exacerbation symptoms, refilled his albuterol solution and he will start doing the neb treatments 3-4 times a day.  He will follow-up with pulmonology as scheduled.

## 2017-11-02 PROBLEM — K63.5 DYSPLASTIC COLON POLYP: Status: ACTIVE | Noted: 2017-01-01

## 2017-11-20 NOTE — PROGRESS NOTES
"CHIEF COMPLAINT:    History of tubular adenoma with moderate dysplasia 1 year ago    HISTORY OF PRESENT ILLNESS:    Dru Brownlee is a 71 y.o. male who underwent previous colonoscopy with removal of multiple polyps.  One polyp was shown to be a tubular adenoma with moderate dysplasia.  He was recommended to undergo repeat surveillance colonoscopy in one year.  He is here today to make arrangements for this.  He notes no recent changes in his stools, no abdominal pain, no blood in his stool.    He does note that he has recently been placed on antibiotics for pneumonia.  He believes that he is improving from this.    Past Medical History:   Diagnosis Date   • Abrasion of lower limb     Abrasion and/or friction burn of lower limb, infected   • Acute bronchitis    • Acute pharyngitis    • Acute sinusitis    • Acute suppurative otitis media without spontaneous rupture of ear drum    • Acute upper respiratory infection    • Allergic rhinitis    • Atrial fibrillation     Atrial fibrillation - SR   • Chronic anemia    • Chronic rhinitis    • Congestive heart failure    • COPD (chronic obstructive pulmonary disease)     \"black lung\"   • Dyspnea on exertion    • Encounter for immunization    • Encounter for screening for malignant neoplasm of prostate     Screening for malignant neoplasm of prostate   • Essential hypertension    • Fibrosis of lung    • H/O arthritis    • Hiatal hernia    • History of diagnostic ultrasound 07/16/2015    US SOFT TISSUE OR THYROID 54108 ([facility]) (1) - ALEJ[facility]DRO NAVA ([facility])    • History of echocardiogram 06/17/2016    ECHO W/M MODE 36344 ([facility]) (2) - ALEJANDRO NAVA ([place])    • History of PFTs 06/17/2016    PFT - PRE AND POST 20735 ([facility]) (1) - [facility] NAVA ([facility])    • Hyperglycemia    • Hyperlipidemia    • Hypertensive disorder    • Kidney stone    • Male erectile disorder    • Multinodular goiter    • Need for prophylactic vaccination and inoculation against influenza    • Neoplasm of uncertain " behavior of skin    • Pernicious anemia    • Primary malignant neoplasm of prostate    • Raised prostate specific antigen    • Rheumatoid arthritis    • Thyroid nodule    • Unilateral inguinal hernia    • Viral upper respiratory tract infection        Past Surgical History:   Procedure Laterality Date   • ABDOMINAL HERNIA REPAIR Right     Repair abdominal hernia (1) - right inguinal and umbilical   • BACK SURGERY  1986    Back Surgery (1)   • CATARACT EXTRACTION Right    • CYSTOSCOPY, RETROGRADE PYELOGRAM AND STENT INSERTION Left 6/30/2017    Procedure: CYSTOSCOPY, LEFT RETROGRADE PYELOGRAM AND STENT INSERTION;  Surgeon: Ken Nunez MD;  Location: [facility];  Service:    • CYSTOSCOPY, URETEROSCOPY, RETROGRADE PYELOGRAM, STENT INSERTION Left 7/7/2017    Procedure: CYSTOSCOPY, LEFT URETEROSCOPY, RETROGRADE PYELOGRAM, HOLMIUM LASER, STENT INSERTION;  Surgeon: Ken Nunez MD;  Location: [facility];  Service:    • CYSTOSCOPY, URETEROSCOPY, RETROGRADE PYELOGRAM, STENT INSERTION Left 9/15/2017    Procedure: CYSTOSCOPY, LEFT URETEROSCOPY, RETROGRADE PYELOGRAM, HOLMIUM LASER, STENT INSERTION;  Surgeon: Ken Nunez MD;  Location: [facility];  Service:    • ENDOSCOPY      Colon endoscopy 79071 (1)   • INJECTION OF MEDICATION  01/26/2015    B12 (9) - ALEJ[facility]DR[facility] DIOR ([facility])    • INJECTION OF MEDICATION  04/15/2014    Depo Medrol (Methylprednisone) 80mg (1) - A[facility]O DIOR ([facility])    • INJECTION OF MEDICATION  01/09/2014    Kenalog (1) - ALEJANDRO DIOR ([facility])        Prior to Admission medications    Medication Sig Start Date End Date Taking? Authorizing Provider   albuterol (PROVENTIL) (2.5 MG/3ML) 0.083% nebulizer solution Take 2.5 mg by nebulization Every 4 (Four) Hours As Needed for Wheezing. 10/24/17  Yes Pretty Dior MD   albuterol (VENTOLIN HFA) 108 (90 BASE) MCG/ACT inhaler Inhale 2 puffs Every 4 (Four) Hours As Needed for Shortness of Air. 3/28/17  Yes Pretty Dior MD   amiodarone (PACERONE) 200 MG tablet Take  200 mg by mouth see administration instructions. Take medication on Monday, Wednesday and Friday.   Yes Historical Provider, MD   amLODIPine (NORVASC) 10 MG tablet Take 10 mg by mouth Daily.   Yes Historical Provider, MD   cefuroxime (CEFTIN) 500 MG tablet Take 1 tablet by mouth 2 (Two) Times a Day. 10/24/17  Yes Pretty Dior MD   Cyanocobalamin (VITAMIN B-12 ER PO) Take 500 mcg by mouth daily. Med Name: Vitamin B-12  mcg tablet,extended release   Yes Historical Provider, MD   fluticasone (FLONASE) 50 MCG/ACT nasal spray 2 sprays into each nostril Daily. Administer 2 sprays in each nostril for each dose. 9/1/17  Yes MEY Key   guaifenesin-dextromethorphan (MUCINEX DM)  MG tablet sustained-release 12 hour tablet Take 1 tablet by mouth 2 (Two) Times a Day As Needed (cough). 10/24/17  Yes Pretty Dior MD   metoprolol succinate XL (TOPROL-XL) 50 MG 24 hr tablet Take 50 mg by mouth Daily.   Yes Historical Provider, MD   O2 (OXYGEN) Inhale 2 L/min 1 (One) Time.   Yes Historical Provider, MD   omeprazole (priLOSEC) 20 MG capsule Take 40 mg by mouth Daily.   Yes Historical Provider, MD   pravastatin (PRAVACHOL) 40 MG tablet Take 1 tablet by mouth Daily. AT BEDTIME 9/14/17  Yes Pretty Dior MD   tamsulosin (FLOMAX) 0.4 MG capsule 24 hr capsule Take 1 capsule by mouth Every Night.   Yes Historical Provider, MD   apixaban (ELIQUIS) 5 MG tablet tablet Take 1 tablet by mouth 2 (Two) Times a Day. Last dose 9/12/17 am 11/15/17   Pretty Dior MD   oxyCODONE-acetaminophen (PERCOCET) 7.5-325 MG per tablet Take 1-2 tablets by mouth Every 4 (Four) Hours As Needed (Pain). 9/15/17   Ken Nunez MD   promethazine-dextromethorphan (PROMETHAZINE-DM) 6.25-15 MG/5ML syrup Take 5 mL by mouth At Night As Needed (cough and nausea). Do not drive with this medication 9/1/17   MEY Key       No Known Allergies    Family History   Problem Relation Age of Onset   • Cancer Other    •  "Diabetes Other    • Heart disease Other    • COPD Other    • Other Other      BLACK LUNG       Social History     Social History   • Marital status:      Spouse name: N/A   • Number of children: N/A   • Years of education: N/A     Occupational History   • retired  x 30 yrs      Social History Main Topics   • Smoking status: Never Smoker   • Smokeless tobacco: Never Used   • Alcohol use No   • Drug use: No   • Sexual activity: Defer      Comment: Marital status:      Other Topics Concern   • Not on file     Social History Narrative       Review of Systems   Constitutional: Negative for activity change, appetite change, chills and fever.   HENT: Negative for hearing loss, nosebleeds and trouble swallowing.    Respiratory: Positive for cough and shortness of breath.    Cardiovascular: Negative for chest pain, palpitations and leg swelling.   Gastrointestinal: Negative for abdominal distention, abdominal pain, anal bleeding, blood in stool, constipation, diarrhea, nausea, rectal pain and vomiting.   Endocrine: Negative for cold intolerance, heat intolerance, polydipsia and polyuria.   Genitourinary: Negative for decreased urine volume, difficulty urinating, dysuria, enuresis, frequency, hematuria and urgency.   Musculoskeletal: Negative for arthralgias, back pain, gait problem, myalgias and neck pain.   Skin: Negative for pallor, rash and wound.   Allergic/Immunologic: Negative for immunocompromised state.   Neurological: Negative for dizziness, seizures, weakness, light-headedness, numbness and headaches.   Psychiatric/Behavioral: Negative for agitation and behavioral problems. The patient is not nervous/anxious.        Objective     /70  Ht 72\" (182.9 cm)  Wt 202 lb (91.6 kg)  BMI 27.4 kg/m2    Physical Exam   Constitutional: He is oriented to person, place, and time. He appears well-developed and well-nourished.   HENT:   Head: Normocephalic and atraumatic.   Nose: Nose normal. "   Eyes: Conjunctivae and EOM are normal. Right eye exhibits no discharge. Left eye exhibits no discharge.   Neck: Trachea normal, normal range of motion and phonation normal. Neck supple. No JVD present. No tracheal deviation and no edema present. No thyromegaly present.   Cardiovascular: Normal rate, regular rhythm and normal heart sounds.  Exam reveals no gallop and no friction rub.    No murmur heard.  Pulmonary/Chest: Effort normal and breath sounds normal. No accessory muscle usage. No respiratory distress. He has no decreased breath sounds. He has no wheezes. He has no rales. He exhibits no tenderness.   Abdominal: Soft. He exhibits no distension, no fluid wave, no ascites, no pulsatile midline mass and no mass. There is no tenderness. There is no rebound and no guarding. No hernia.   Musculoskeletal: Normal range of motion. He exhibits no edema, tenderness or deformity.   Lymphadenopathy:     He has no cervical adenopathy.        Left: No supraclavicular adenopathy present.   Neurological: He is alert and oriented to person, place, and time. He has normal strength. No cranial nerve deficit.   Skin: Skin is warm and dry. No rash noted. He is not diaphoretic. No erythema. No pallor.   Psychiatric: He has a normal mood and affect. His speech is normal and behavior is normal. Judgment and thought content normal. Cognition and memory are normal.   Vitals reviewed.        ASSESSMENT:    History of tubular adenoma with moderate dysplasia    PLAN:    He will need surveillance colonoscopy.  We will plan to do this after his pneumonia and breathing issues have completely resolved.  He is scheduled for 12/5/2017.  He will hold his Eliquis for 3 days prior to colonoscopy.          This document has been electronically signed by Ken Garcia MD on November 20, 2017 3:24 PM

## 2017-12-05 NOTE — H&P
"Ken Garcia MD Physician Signed  Progress Notes Date of Service: 11/2/2017  2:35 PM   Related encounter: Office Visit from 11/2/2017 in [facility] GENERAL SURGERY with Ken Garcia MD      Expand All Collapse All    []Hide copied text  CHIEF COMPLAINT:     History of tubular adenoma with moderate dysplasia 1 year ago     HISTORY OF PRESENT ILLNESS:     Dru Brownlee is a 71 y.o. male who underwent previous colonoscopy with removal of multiple polyps.  One polyp was shown to be a tubular adenoma with moderate dysplasia.  He was recommended to undergo repeat surveillance colonoscopy in one year.  He is here today to make arrangements for this.  He notes no recent changes in his stools, no abdominal pain, no blood in his stool.     He does note that he has recently been placed on antibiotics for pneumonia.  He believes that he is improving from this.      Medical History         Past Medical History:   Diagnosis Date   • Abrasion of lower limb       Abrasion and/or friction burn of lower limb, infected   • Acute bronchitis     • Acute pharyngitis     • Acute sinusitis     • Acute suppurative otitis media without spontaneous rupture of ear drum     • Acute upper respiratory infection     • Allergic rhinitis     • Atrial fibrillation       Atrial fibrillation - SR   • Chronic anemia     • Chronic rhinitis     • Congestive heart failure     • COPD (chronic obstructive pulmonary disease)       \"black lung\"   • Dyspnea on exertion     • Encounter for immunization     • Encounter for screening for malignant neoplasm of prostate       Screening for malignant neoplasm of prostate   • Essential hypertension     • Fibrosis of lung     • H/O arthritis     • Hiatal hernia     • History of diagnostic ultrasound 07/16/2015     US SOFT TISSUE OR THYROID 38201 ([facility]) (1) - [facility]DR[facility] NAVA ([facility])    • History of echocardiogram 06/17/2016     ECHO W/M MODE 73920 ([facility]) (2) - ALEJANDRO NAVA " ([place])    • History of PFTs 06/17/2016     PFT - PRE AND POST 01955 ([facility]) (1) - [facility] NAVA ([facility])    • Hyperglycemia     • Hyperlipidemia     • Hypertensive disorder     • Kidney stone     • Male erectile disorder     • Multinodular goiter     • Need for prophylactic vaccination and inoculation against influenza     • Neoplasm of uncertain behavior of skin     • Pernicious anemia     • Primary malignant neoplasm of prostate     • Raised prostate specific antigen     • Rheumatoid arthritis     • Thyroid nodule     • Unilateral inguinal hernia     • Viral upper respiratory tract infection               Surgical History          Past Surgical History:   Procedure Laterality Date   • ABDOMINAL HERNIA REPAIR Right       Repair abdominal hernia (1) - right inguinal and umbilical   • BACK SURGERY   1986     Back Surgery (1)   • CATARACT EXTRACTION Right     • CYSTOSCOPY, RETROGRADE PYELOGRAM AND STENT INSERTION Left 6/30/2017     Procedure: CYSTOSCOPY, LEFT RETROGRADE PYELOGRAM AND STENT INSERTION;  Surgeon: Ken Nunez MD;  Location: [facility];  Service:    • CYSTOSCOPY, URETEROSCOPY, RETROGRADE PYELOGRAM, STENT INSERTION Left 7/7/2017     Procedure: CYSTOSCOPY, LEFT URETEROSCOPY, RETROGRADE PYELOGRAM, HOLMIUM LASER, STENT INSERTION;  Surgeon: Ken Nunez MD;  Location: [facility];  Service:    • CYSTOSCOPY, URETEROSCOPY, RETROGRADE PYELOGRAM, STENT INSERTION Left 9/15/2017     Procedure: CYSTOSCOPY, LEFT URETEROSCOPY, RETROGRADE PYELOGRAM, HOLMIUM LASER, STENT INSERTION;  Surgeon: Ken Nunez MD;  Location: [facility];  Service:    • ENDOSCOPY         Colon endoscopy 87301 (1)   • INJECTION OF MEDICATION   01/26/2015     B12 (9) - A[facility]DRO NAVA ([facility])    • INJECTION OF MEDICATION   04/15/2014     Depo Medrol (Methylprednisone) 80mg (1) - A[facility]DRO NAVA ([facility])    • INJECTION OF MEDICATION   01/09/2014     Kenalog (1) - ALEJ[facility]DRO NAVA ([facility])                     Prior to Admission medications    Medication Sig  Start Date End Date Taking? Authorizing Provider   albuterol (PROVENTIL) (2.5 MG/3ML) 0.083% nebulizer solution Take 2.5 mg by nebulization Every 4 (Four) Hours As Needed for Wheezing. 10/24/17   Yes Pretty Dior MD   albuterol (VENTOLIN HFA) 108 (90 BASE) MCG/ACT inhaler Inhale 2 puffs Every 4 (Four) Hours As Needed for Shortness of Air. 3/28/17   Yes Pretty Dior MD   amiodarone (PACERONE) 200 MG tablet Take 200 mg by mouth see administration instructions. Take medication on Monday, Wednesday and Friday.     Yes Historical Provider, MD   amLODIPine (NORVASC) 10 MG tablet Take 10 mg by mouth Daily.     Yes Historical Provider, MD   cefuroxime (CEFTIN) 500 MG tablet Take 1 tablet by mouth 2 (Two) Times a Day. 10/24/17   Yes Pretty Dior MD   Cyanocobalamin (VITAMIN B-12 ER PO) Take 500 mcg by mouth daily. Med Name: Vitamin B-12  mcg tablet,extended release     Yes Historical Provider, MD   fluticasone (FLONASE) 50 MCG/ACT nasal spray 2 sprays into each nostril Daily. Administer 2 sprays in each nostril for each dose. 9/1/17   Yes MEY Key   guaifenesin-dextromethorphan (MUCINEX DM)  MG tablet sustained-release 12 hour tablet Take 1 tablet by mouth 2 (Two) Times a Day As Needed (cough). 10/24/17   Yes Pretty Dior MD   metoprolol succinate XL (TOPROL-XL) 50 MG 24 hr tablet Take 50 mg by mouth Daily.     Yes Historical Provider, MD   O2 (OXYGEN) Inhale 2 L/min 1 (One) Time.     Yes Historical Provider, MD   omeprazole (priLOSEC) 20 MG capsule Take 40 mg by mouth Daily.     Yes Historical Provider, MD   pravastatin (PRAVACHOL) 40 MG tablet Take 1 tablet by mouth Daily. AT BEDTIME 9/14/17   Yes Pretty Dior MD   tamsulosin (FLOMAX) 0.4 MG capsule 24 hr capsule Take 1 capsule by mouth Every Night.     Yes Historical Provider, MD   apixaban (ELIQUIS) 5 MG tablet tablet Take 1 tablet by mouth 2 (Two) Times a Day. Last dose 9/12/17 am 11/15/17     Pretty Dior MD    oxyCODONE-acetaminophen (PERCOCET) 7.5-325 MG per tablet Take 1-2 tablets by mouth Every 4 (Four) Hours As Needed (Pain). 9/15/17     Ken Nunez MD   promethazine-dextromethorphan (PROMETHAZINE-DM) 6.25-15 MG/5ML syrup Take 5 mL by mouth At Night As Needed (cough and nausea). Do not drive with this medication 9/1/17     MEY Key         No Known Allergies            Family History   Problem Relation Age of Onset   • Cancer Other     • Diabetes Other     • Heart disease Other     • COPD Other     • Other Other         BLACK LUNG          Social History    Social History            Social History   • Marital status:        Spouse name: N/A   • Number of children: N/A   • Years of education: N/A           Occupational History   • retired  x 30 yrs               Social History Main Topics   • Smoking status: Never Smoker   • Smokeless tobacco: Never Used   • Alcohol use No   • Drug use: No   • Sexual activity: Defer         Comment: Marital status:            Other Topics Concern   • Not on file      Social History Narrative            Review of Systems   Constitutional: Negative for activity change, appetite change, chills and fever.   HENT: Negative for hearing loss, nosebleeds and trouble swallowing.    Respiratory: Positive for cough and shortness of breath.    Cardiovascular: Negative for chest pain, palpitations and leg swelling.   Gastrointestinal: Negative for abdominal distention, abdominal pain, anal bleeding, blood in stool, constipation, diarrhea, nausea, rectal pain and vomiting.   Endocrine: Negative for cold intolerance, heat intolerance, polydipsia and polyuria.   Genitourinary: Negative for decreased urine volume, difficulty urinating, dysuria, enuresis, frequency, hematuria and urgency.   Musculoskeletal: Negative for arthralgias, back pain, gait problem, myalgias and neck pain.   Skin: Negative for pallor, rash and wound.   Allergic/Immunologic: Negative  "for immunocompromised state.   Neurological: Negative for dizziness, seizures, weakness, light-headedness, numbness and headaches.   Psychiatric/Behavioral: Negative for agitation and behavioral problems. The patient is not nervous/anxious.             Objective          /70  Ht 72\" (182.9 cm)  Wt 202 lb (91.6 kg)  BMI 27.4 kg/m2     Physical Exam   Constitutional: He is oriented to person, place, and time. He appears well-developed and well-nourished.   HENT:   Head: Normocephalic and atraumatic.   Nose: Nose normal.   Eyes: Conjunctivae and EOM are normal. Right eye exhibits no discharge. Left eye exhibits no discharge.   Neck: Trachea normal, normal range of motion and phonation normal. Neck supple. No JVD present. No tracheal deviation and no edema present. No thyromegaly present.   Cardiovascular: Normal rate, regular rhythm and normal heart sounds.  Exam reveals no gallop and no friction rub.    No murmur heard.  Pulmonary/Chest: Effort normal and breath sounds normal. No accessory muscle usage. No respiratory distress. He has no decreased breath sounds. He has no wheezes. He has no rales. He exhibits no tenderness.   Abdominal: Soft. He exhibits no distension, no fluid wave, no ascites, no pulsatile midline mass and no mass. There is no tenderness. There is no rebound and no guarding. No hernia.   Musculoskeletal: Normal range of motion. He exhibits no edema, tenderness or deformity.   Lymphadenopathy:     He has no cervical adenopathy.        Left: No supraclavicular adenopathy present.   Neurological: He is alert and oriented to person, place, and time. He has normal strength. No cranial nerve deficit.   Skin: Skin is warm and dry. No rash noted. He is not diaphoretic. No erythema. No pallor.   Psychiatric: He has a normal mood and affect. His speech is normal and behavior is normal. Judgment and thought content normal. Cognition and memory are normal.   Vitals " reviewed.           ASSESSMENT:     History of tubular adenoma with moderate dysplasia     PLAN:     He will need surveillance colonoscopy.  We will plan to do this after his pneumonia and breathing issues have completely resolved.  He is scheduled for 12/5/2017.  He will hold his Eliquis for 3 days prior to colonoscopy.             This document has been electronically signed by Ken Garcia MD on November 20, 2017 3:24 PM                              Update: As above. Pneumonia resolved.  Risks and benefits of colonoscopy were discussed.          This document has been electronically signed by Ken Garcia MD on December 5, 2017 7:50 AM

## 2017-12-05 NOTE — ANESTHESIA POSTPROCEDURE EVALUATION
Patient: Dru Brownlee    Procedure Summary     Date Anesthesia Start Anesthesia Stop Room / Location    12/05/17 0803 0836 [facility] ENDOSCOPY 1 / [facility] ENDOSCOPY       Procedure Diagnosis Surgeon Provider    COLONOSCOPY (N/A ) Dysplastic colon polyp  (Dysplastic colon polyp [K63.5]) MD Diana Ayala CRNA          Anesthesia Type: MAC  Last vitals  BP   109/63 (12/05/17 0720)   Temp   97.6 °F (36.4 °C) (12/05/17 0720)   Pulse   69 (12/05/17 0720)   Resp   18 (12/05/17 0720)     SpO2   93 % (12/05/17 0720)     Post Anesthesia Care and Evaluation    Patient location during evaluation: PACU  Patient participation: complete - patient participated  Level of consciousness: awake and awake and alert  Pain management: adequate  Airway patency: patent  Anesthetic complications: No anesthetic complications  PONV Status: none  Cardiovascular status: acceptable  Respiratory status: acceptable  Hydration status: acceptable

## 2017-12-05 NOTE — NURSING NOTE
Pt states nausea is better.  Pt continues to be drowsy, falling back to sleep quickly after verbal arousal.  Continue to let patient rest.  Family at bedside.

## 2017-12-05 NOTE — PLAN OF CARE
Problem: Patient Care Overview (Adult)  Goal: Plan of Care Review    12/05/17 0836   Coping/Psychosocial Response Interventions   Plan Of Care Reviewed With patient   Patient Care Overview   Progress no change   Outcome Evaluation   Outcome Summary/Follow up Plan vss         Problem: GI Endoscopy (Adult)  Goal: Signs and Symptoms of Listed Potential Problems Will be Absent or Manageable (GI Endoscopy)    12/05/17 0836   GI Endoscopy   Problems Assessed (GI Endoscopy) all   Problems Present (GI Endoscopy) none

## 2017-12-05 NOTE — ANESTHESIA PREPROCEDURE EVALUATION
" Anesthesia Evaluation     Patient summary reviewed and Nursing notes reviewed   NPO Solid Status: > 8 hours  NPO Liquid Status: > 2 hours     Airway   Mallampati: II  TM distance: >3 FB  Neck ROM: full  possible difficult intubation  Dental    (+) edentulous    Pulmonary    (+) COPD severe, shortness of breath, rhonchi, decreased breath sounds,     ROS comment: Oxygen-dependent lung disease; \"black lung\"  Cardiovascular     PT is on anticoagulation therapy  Rhythm: irregular    (+) hypertension, dysrhythmias Atrial Fib, CHF, hyperlipidemia      Neuro/Psych- negative ROS  GI/Hepatic/Renal/Endo - negative ROS     Musculoskeletal (-) negative ROS    Abdominal  - normal exam   Substance History - negative use     OB/GYN negative ob/gyn ROS         Other      history of cancer                                    Anesthesia Plan    ASA 4     MAC     Anesthetic plan and risks discussed with patient.      "

## 2017-12-05 NOTE — NURSING NOTE
Pt vomiting small amount of clear liquid after sipping on a Sprite.  Zofran 4 mg given IV per order.

## 2017-12-05 NOTE — PLAN OF CARE
Problem: Patient Care Overview (Adult)  Goal: Plan of Care Review  Outcome: Outcome(s) achieved Date Met:  12/05/17 12/05/17 0854   Coping/Psychosocial Response Interventions   Plan Of Care Reviewed With patient   Patient Care Overview   Progress no change   Outcome Evaluation   Outcome Summary/Follow up Plan vss         Problem: GI Endoscopy (Adult)  Goal: Signs and Symptoms of Listed Potential Problems Will be Absent or Manageable (GI Endoscopy)  Outcome: Outcome(s) achieved Date Met:  12/05/17 12/05/17 0854   GI Endoscopy   Problems Assessed (GI Endoscopy) all   Problems Present (GI Endoscopy) none         12/05/17 0854   GI Endoscopy   Problems Assessed (GI Endoscopy) all   Problems Present (GI Endoscopy) none

## 2018-01-01 ENCOUNTER — HOSPITAL ENCOUNTER (OUTPATIENT)
Facility: HOSPITAL | Age: 73
Discharge: NURSING, REHAB OR LONG-TERM CARE | End: 2018-03-15
Attending: INTERNAL MEDICINE | Admitting: INTERNAL MEDICINE

## 2018-01-01 ENCOUNTER — LAB REQUISITION (OUTPATIENT)
Dept: LAB | Facility: OTHER | Age: 73
End: 2018-01-01

## 2018-01-01 ENCOUNTER — APPOINTMENT (OUTPATIENT)
Dept: GENERAL RADIOLOGY | Facility: HOSPITAL | Age: 73
End: 2018-01-01

## 2018-01-01 ENCOUNTER — OUTSIDE FACILITY SERVICE (OUTPATIENT)
Dept: PULMONOLOGY | Facility: CLINIC | Age: 73
End: 2018-01-01

## 2018-01-01 ENCOUNTER — APPOINTMENT (OUTPATIENT)
Dept: CARDIOLOGY | Facility: HOSPITAL | Age: 73
End: 2018-01-01
Attending: INTERNAL MEDICINE

## 2018-01-01 VITALS — HEART RATE: 71 BPM

## 2018-01-01 DIAGNOSIS — E56.9 VITAMIN DEFICIENCY: ICD-10-CM

## 2018-01-01 DIAGNOSIS — I48.91 ATRIAL FIBRILLATION, UNSPECIFIED TYPE (HCC): ICD-10-CM

## 2018-01-01 DIAGNOSIS — Z74.09 IMPAIRED FUNCTIONAL MOBILITY AND ACTIVITY TOLERANCE: ICD-10-CM

## 2018-01-01 DIAGNOSIS — Z78.9 IMPAIRED MOBILITY AND ADLS: ICD-10-CM

## 2018-01-01 DIAGNOSIS — E78.70 DISORDER OF BILE ACID AND CHOLESTEROL METABOLISM, UNSPECIFIED: ICD-10-CM

## 2018-01-01 DIAGNOSIS — R13.12 OROPHARYNGEAL DYSPHAGIA: ICD-10-CM

## 2018-01-01 DIAGNOSIS — I10 ESSENTIAL (PRIMARY) HYPERTENSION: ICD-10-CM

## 2018-01-01 DIAGNOSIS — I50.9 CONGESTIVE HEART FAILURE, UNSPECIFIED CONGESTIVE HEART FAILURE CHRONICITY, UNSPECIFIED CONGESTIVE HEART FAILURE TYPE: ICD-10-CM

## 2018-01-01 DIAGNOSIS — I10 ESSENTIAL HYPERTENSION: ICD-10-CM

## 2018-01-01 DIAGNOSIS — E11.9 TYPE 2 DIABETES MELLITUS WITHOUT COMPLICATIONS (HCC): ICD-10-CM

## 2018-01-01 DIAGNOSIS — Z74.09 IMPAIRED MOBILITY AND ADLS: ICD-10-CM

## 2018-01-01 LAB
ALBUMIN SERPL-MCNC: 2.2 G/DL (ref 3.4–4.8)
ALBUMIN SERPL-MCNC: 2.3 G/DL (ref 3.4–4.8)
ALBUMIN SERPL-MCNC: 2.4 G/DL (ref 3.2–5.5)
ALBUMIN SERPL-MCNC: 2.4 G/DL (ref 3.4–4.8)
ALBUMIN SERPL-MCNC: 2.4 G/DL (ref 3.4–4.8)
ALBUMIN SERPL-MCNC: 2.7 G/DL (ref 3.4–4.8)
ALBUMIN/GLOB SERPL: 0.8 G/DL (ref 1.1–1.8)
ALBUMIN/GLOB SERPL: 0.8 G/DL (ref 1–3)
ALBUMIN/GLOB SERPL: 0.9 G/DL (ref 1.1–1.8)
ALP SERPL-CCNC: 53 U/L (ref 38–126)
ALP SERPL-CCNC: 58 U/L (ref 38–126)
ALP SERPL-CCNC: 59 U/L (ref 38–126)
ALP SERPL-CCNC: 60 U/L (ref 38–126)
ALP SERPL-CCNC: 62 U/L (ref 15–121)
ALP SERPL-CCNC: 64 U/L (ref 38–126)
ALP SERPL-CCNC: 64 U/L (ref 38–126)
ALP SERPL-CCNC: 65 U/L (ref 38–126)
ALP SERPL-CCNC: 65 U/L (ref 38–126)
ALP SERPL-CCNC: 66 U/L (ref 38–126)
ALP SERPL-CCNC: 68 U/L (ref 38–126)
ALP SERPL-CCNC: 71 U/L (ref 38–126)
ALT SERPL W P-5'-P-CCNC: 105 U/L (ref 21–72)
ALT SERPL W P-5'-P-CCNC: 115 U/L (ref 21–72)
ALT SERPL W P-5'-P-CCNC: 122 U/L (ref 21–72)
ALT SERPL W P-5'-P-CCNC: 124 U/L (ref 21–72)
ALT SERPL W P-5'-P-CCNC: 134 U/L (ref 21–72)
ALT SERPL W P-5'-P-CCNC: 41 U/L (ref 10–60)
ALT SERPL W P-5'-P-CCNC: 53 U/L (ref 21–72)
ALT SERPL W P-5'-P-CCNC: 55 U/L (ref 21–72)
ALT SERPL W P-5'-P-CCNC: 57 U/L (ref 21–72)
ALT SERPL W P-5'-P-CCNC: 68 U/L (ref 21–72)
ALT SERPL W P-5'-P-CCNC: 86 U/L (ref 21–72)
ALT SERPL W P-5'-P-CCNC: 97 U/L (ref 21–72)
ANION GAP SERPL CALCULATED.3IONS-SCNC: 3 MMOL/L (ref 5–15)
ANION GAP SERPL CALCULATED.3IONS-SCNC: 4 MMOL/L (ref 5–15)
ANION GAP SERPL CALCULATED.3IONS-SCNC: 5 MMOL/L (ref 5–15)
ANION GAP SERPL CALCULATED.3IONS-SCNC: 5 MMOL/L (ref 5–15)
ANION GAP SERPL CALCULATED.3IONS-SCNC: 6 MMOL/L (ref 5–15)
ANION GAP SERPL CALCULATED.3IONS-SCNC: 7 MMOL/L (ref 5–15)
ANION GAP SERPL CALCULATED.3IONS-SCNC: 8 MMOL/L (ref 5–15)
ANION GAP SERPL CALCULATED.3IONS-SCNC: 8 MMOL/L (ref 5–15)
ANISOCYTOSIS BLD QL: NORMAL
ANISOCYTOSIS BLD QL: NORMAL
AST SERPL-CCNC: 19 U/L (ref 10–60)
AST SERPL-CCNC: 21 U/L (ref 17–59)
AST SERPL-CCNC: 23 U/L (ref 17–59)
AST SERPL-CCNC: 24 U/L (ref 17–59)
AST SERPL-CCNC: 26 U/L (ref 17–59)
AST SERPL-CCNC: 28 U/L (ref 17–59)
AST SERPL-CCNC: 28 U/L (ref 17–59)
AST SERPL-CCNC: 29 U/L (ref 17–59)
AST SERPL-CCNC: 33 U/L (ref 17–59)
BACTERIA SPEC AEROBE CULT: NORMAL
BACTERIA SPEC AEROBE CULT: NORMAL
BACTERIA SPEC RESP CULT: NORMAL
BACTERIA SPEC RESP CULT: NORMAL
BASOPHILS # BLD AUTO: 0.01 10*3/MM3 (ref 0–0.2)
BASOPHILS NFR BLD AUTO: 0.3 % (ref 0–2)
BH CV ECHO MEAS - ACS: 2.4 CM
BH CV ECHO MEAS - AO MAX PG (FULL): 3.7 MMHG
BH CV ECHO MEAS - AO MAX PG: 9 MMHG
BH CV ECHO MEAS - AO MEAN PG (FULL): 2 MMHG
BH CV ECHO MEAS - AO MEAN PG: 6 MMHG
BH CV ECHO MEAS - AO ROOT AREA (BSA CORRECTED): 1.8
BH CV ECHO MEAS - AO ROOT AREA: 11.7 CM^2
BH CV ECHO MEAS - AO ROOT DIAM: 3.9 CM
BH CV ECHO MEAS - AO V2 MAX: 150 CM/SEC
BH CV ECHO MEAS - AO V2 MEAN: 119.6 CM/SEC
BH CV ECHO MEAS - AO V2 VTI: 22.6 CM
BH CV ECHO MEAS - AVA(I,A): 4 CM^2
BH CV ECHO MEAS - AVA(I,D): 4 CM^2
BH CV ECHO MEAS - AVA(V,A): 3.4 CM^2
BH CV ECHO MEAS - AVA(V,D): 3.4 CM^2
BH CV ECHO MEAS - BSA(HAYCOCK): 2.2 M^2
BH CV ECHO MEAS - BSA: 2.2 M^2
BH CV ECHO MEAS - BZI_BMI: 28.4 KILOGRAMS/M^2
BH CV ECHO MEAS - BZI_METRIC_HEIGHT: 183 CM
BH CV ECHO MEAS - BZI_METRIC_WEIGHT: 95 KG
BH CV ECHO MEAS - EDV(CUBED): 57.7 ML
BH CV ECHO MEAS - EDV(TEICH): 64.5 ML
BH CV ECHO MEAS - EF(CUBED): 72.8 %
BH CV ECHO MEAS - EF(TEICH): 65.2 %
BH CV ECHO MEAS - ESV(CUBED): 15.7 ML
BH CV ECHO MEAS - ESV(TEICH): 22.4 ML
BH CV ECHO MEAS - FS: 35.2 %
BH CV ECHO MEAS - IVS/LVPW: 1
BH CV ECHO MEAS - IVSD: 1.4 CM
BH CV ECHO MEAS - LA DIMENSION: 4 CM
BH CV ECHO MEAS - LA/AO: 1
BH CV ECHO MEAS - LV MASS(C)D: 207.7 GRAMS
BH CV ECHO MEAS - LV MASS(C)DI: 95.6 GRAMS/M^2
BH CV ECHO MEAS - LV MAX PG: 5.3 MMHG
BH CV ECHO MEAS - LV MEAN PG: 4 MMHG
BH CV ECHO MEAS - LV V1 MAX: 115 CM/SEC
BH CV ECHO MEAS - LV V1 MEAN: 98.1 CM/SEC
BH CV ECHO MEAS - LV V1 VTI: 20.5 CM
BH CV ECHO MEAS - LVIDD: 3.9 CM
BH CV ECHO MEAS - LVIDS: 2.5 CM
BH CV ECHO MEAS - LVOT AREA: 4.4 CM^2
BH CV ECHO MEAS - LVOT DIAM: 2.4 CM
BH CV ECHO MEAS - LVPWD: 1.4 CM
BH CV ECHO MEAS - MR MAX PG: 72.7 MMHG
BH CV ECHO MEAS - MR MAX VEL: 426.3 CM/SEC
BH CV ECHO MEAS - MV MAX PG: 9.5 MMHG
BH CV ECHO MEAS - MV MEAN PG: 3.1 MMHG
BH CV ECHO MEAS - MV P1/2T MAX VEL: 91.7 CM/SEC
BH CV ECHO MEAS - MV V2 MAX: 154 CM/SEC
BH CV ECHO MEAS - MV V2 MEAN: 113 CM/SEC
BH CV ECHO MEAS - MV V2 VTI: 18.4 CM
BH CV ECHO MEAS - MVA P1/2T LCG: 2.4 CM^2
BH CV ECHO MEAS - MVA(VTI): 4.9 CM^2
BH CV ECHO MEAS - PA MAX PG: 3.8 MMHG
BH CV ECHO MEAS - PA V2 MAX: 97.7 CM/SEC
BH CV ECHO MEAS - RAP SYSTOLE: 5 MMHG
BH CV ECHO MEAS - RVDD: 2.2 CM
BH CV ECHO MEAS - RVSP: 38.1 MMHG
BH CV ECHO MEAS - SI(AO): 121.2 ML/M^2
BH CV ECHO MEAS - SI(CUBED): 19.3 ML/M^2
BH CV ECHO MEAS - SI(LVOT): 41.5 ML/M^2
BH CV ECHO MEAS - SI(TEICH): 19.3 ML/M^2
BH CV ECHO MEAS - SV(AO): 263.4 ML
BH CV ECHO MEAS - SV(CUBED): 42 ML
BH CV ECHO MEAS - SV(LVOT): 90.3 ML
BH CV ECHO MEAS - SV(TEICH): 42.1 ML
BH CV ECHO MEAS - TR MAX VEL: 287.6 CM/SEC
BILIRUB SERPL-MCNC: 0.7 MG/DL (ref 0.2–1.3)
BILIRUB SERPL-MCNC: 0.8 MG/DL (ref 0.2–1.3)
BILIRUB SERPL-MCNC: 0.9 MG/DL (ref 0.2–1.3)
BILIRUB SERPL-MCNC: 1.1 MG/DL (ref 0.2–1)
BILIRUB SERPL-MCNC: 1.1 MG/DL (ref 0.2–1.3)
BILIRUB SERPL-MCNC: 1.1 MG/DL (ref 0.2–1.3)
BILIRUB SERPL-MCNC: 1.2 MG/DL (ref 0.2–1.3)
BILIRUB SERPL-MCNC: 1.4 MG/DL (ref 0.2–1.3)
BUN BLD-MCNC: 14 MG/DL (ref 7–21)
BUN BLD-MCNC: 14 MG/DL (ref 7–21)
BUN BLD-MCNC: 15 MG/DL (ref 7–21)
BUN BLD-MCNC: 15 MG/DL (ref 7–21)
BUN BLD-MCNC: 17 MG/DL (ref 8–25)
BUN BLD-MCNC: 18 MG/DL (ref 7–21)
BUN BLD-MCNC: 19 MG/DL (ref 7–21)
BUN BLD-MCNC: 19 MG/DL (ref 7–21)
BUN BLD-MCNC: 22 MG/DL (ref 7–21)
BUN BLD-MCNC: 24 MG/DL (ref 7–21)
BUN/CREAT SERPL: 28.3 (ref 7–25)
BUN/CREAT SERPL: 31.1 (ref 7–25)
BUN/CREAT SERPL: 31.8 (ref 7–25)
BUN/CREAT SERPL: 34 (ref 7–25)
BUN/CREAT SERPL: 36.6 (ref 7–25)
BUN/CREAT SERPL: 36.7 (ref 7–25)
BUN/CREAT SERPL: 37.3 (ref 7–25)
BUN/CREAT SERPL: 37.5 (ref 7–25)
BUN/CREAT SERPL: 40 (ref 7–25)
BUN/CREAT SERPL: 44.2 (ref 7–25)
BUN/CREAT SERPL: 45.8 (ref 7–25)
BUN/CREAT SERPL: 51.1 (ref 7–25)
CALCIUM SPEC-SCNC: 7.5 MG/DL (ref 8.4–10.2)
CALCIUM SPEC-SCNC: 7.7 MG/DL (ref 8.4–10.2)
CALCIUM SPEC-SCNC: 7.8 MG/DL (ref 8.4–10.2)
CALCIUM SPEC-SCNC: 7.8 MG/DL (ref 8.4–10.2)
CALCIUM SPEC-SCNC: 7.9 MG/DL (ref 8.4–10.2)
CALCIUM SPEC-SCNC: 7.9 MG/DL (ref 8.4–10.2)
CALCIUM SPEC-SCNC: 7.9 MG/DL (ref 8.4–10.8)
CALCIUM SPEC-SCNC: 8 MG/DL (ref 8.4–10.2)
CALCIUM SPEC-SCNC: 8.1 MG/DL (ref 8.4–10.2)
CALCIUM SPEC-SCNC: 8.3 MG/DL (ref 8.4–10.2)
CHLORIDE SERPL-SCNC: 93 MMOL/L (ref 95–110)
CHLORIDE SERPL-SCNC: 94 MMOL/L (ref 95–110)
CHLORIDE SERPL-SCNC: 95 MMOL/L (ref 95–110)
CHLORIDE SERPL-SCNC: 95 MMOL/L (ref 95–110)
CHLORIDE SERPL-SCNC: 96 MMOL/L (ref 95–110)
CHLORIDE SERPL-SCNC: 96 MMOL/L (ref 95–110)
CHLORIDE SERPL-SCNC: 97 MMOL/L (ref 100–112)
CHLORIDE SERPL-SCNC: 97 MMOL/L (ref 95–110)
CHLORIDE SERPL-SCNC: 98 MMOL/L (ref 95–110)
CHOLEST SERPL-MCNC: 110 MG/DL (ref 150–200)
CLUMPED PLATELETS: NORMAL
CO2 SERPL-SCNC: 29 MMOL/L (ref 22–31)
CO2 SERPL-SCNC: 32 MMOL/L (ref 20–32)
CO2 SERPL-SCNC: 32 MMOL/L (ref 22–31)
CO2 SERPL-SCNC: 32 MMOL/L (ref 22–31)
CO2 SERPL-SCNC: 33 MMOL/L (ref 22–31)
CO2 SERPL-SCNC: 34 MMOL/L (ref 22–31)
CO2 SERPL-SCNC: 35 MMOL/L (ref 22–31)
CO2 SERPL-SCNC: 35 MMOL/L (ref 22–31)
CO2 SERPL-SCNC: 38 MMOL/L (ref 22–31)
CREAT BLD-MCNC: 0.4 MG/DL (ref 0.7–1.3)
CREAT BLD-MCNC: 0.41 MG/DL (ref 0.7–1.3)
CREAT BLD-MCNC: 0.43 MG/DL (ref 0.7–1.3)
CREAT BLD-MCNC: 0.44 MG/DL (ref 0.7–1.3)
CREAT BLD-MCNC: 0.45 MG/DL (ref 0.7–1.3)
CREAT BLD-MCNC: 0.45 MG/DL (ref 0.7–1.3)
CREAT BLD-MCNC: 0.47 MG/DL (ref 0.7–1.3)
CREAT BLD-MCNC: 0.48 MG/DL (ref 0.7–1.3)
CREAT BLD-MCNC: 0.49 MG/DL (ref 0.7–1.3)
CREAT BLD-MCNC: 0.51 MG/DL (ref 0.7–1.3)
CREAT BLD-MCNC: 0.53 MG/DL (ref 0.7–1.3)
CREAT BLD-MCNC: 0.6 MG/DL (ref 0.4–1.3)
DEPRECATED RDW RBC AUTO: 49.5 FL (ref 35.1–43.9)
DEPRECATED RDW RBC AUTO: 51.3 FL (ref 35.1–43.9)
DEPRECATED RDW RBC AUTO: 52.3 FL (ref 35.1–43.9)
DEPRECATED RDW RBC AUTO: 56.1 FL (ref 35.1–43.9)
DEPRECATED RDW RBC AUTO: 57.5 FL (ref 35.1–43.9)
DEPRECATED RDW RBC AUTO: 58.9 FL (ref 35.1–43.9)
DEPRECATED RDW RBC AUTO: 59.6 FL (ref 35.1–43.9)
DEPRECATED RDW RBC AUTO: 62 FL (ref 35.1–43.9)
DEPRECATED RDW RBC AUTO: 64 FL (ref 35.1–43.9)
DEPRECATED RDW RBC AUTO: 67.9 FL (ref 35.1–43.9)
DEPRECATED RDW RBC AUTO: 72.1 FL (ref 35.1–43.9)
DEPRECATED RDW RBC AUTO: 87.4 FL (ref 35.1–43.9)
EOSINOPHIL # BLD AUTO: 0 10*3/MM3 (ref 0–0.7)
EOSINOPHIL NFR BLD AUTO: 0 % (ref 0–7)
ERYTHROCYTE [DISTWIDTH] IN BLOOD BY AUTOMATED COUNT: 15 % (ref 11.5–14.5)
ERYTHROCYTE [DISTWIDTH] IN BLOOD BY AUTOMATED COUNT: 15.2 % (ref 11.5–14.5)
ERYTHROCYTE [DISTWIDTH] IN BLOOD BY AUTOMATED COUNT: 15.4 % (ref 11.5–14.5)
ERYTHROCYTE [DISTWIDTH] IN BLOOD BY AUTOMATED COUNT: 16.3 % (ref 11.5–14.5)
ERYTHROCYTE [DISTWIDTH] IN BLOOD BY AUTOMATED COUNT: 16.5 % (ref 11.5–14.5)
ERYTHROCYTE [DISTWIDTH] IN BLOOD BY AUTOMATED COUNT: 16.9 % (ref 11.5–14.5)
ERYTHROCYTE [DISTWIDTH] IN BLOOD BY AUTOMATED COUNT: 17 % (ref 11.5–14.5)
ERYTHROCYTE [DISTWIDTH] IN BLOOD BY AUTOMATED COUNT: 17.7 % (ref 11.5–14.5)
ERYTHROCYTE [DISTWIDTH] IN BLOOD BY AUTOMATED COUNT: 18.4 % (ref 11.5–14.5)
ERYTHROCYTE [DISTWIDTH] IN BLOOD BY AUTOMATED COUNT: 19.7 % (ref 11.5–14.5)
ERYTHROCYTE [DISTWIDTH] IN BLOOD BY AUTOMATED COUNT: 20.9 % (ref 11.5–14.5)
ERYTHROCYTE [DISTWIDTH] IN BLOOD BY AUTOMATED COUNT: 23.7 % (ref 11.5–14.5)
GFR SERPL CREATININE-BSD FRML MDRD: 132 ML/MIN/1.73 (ref 42–98)
GFR SERPL CREATININE-BSD FRML MDRD: 152 ML/MIN/1.73 (ref 42–98)
GFR SERPL CREATININE-BSD FRML MDRD: 159 ML/MIN/1.73 (ref 42–98)
GFR SERPL CREATININE-BSD FRML MDRD: 175 ML/MIN/1.73 (ref 42–98)
GFR SERPL CREATININE-BSD FRML MDRD: 193 ML/MIN/1.73 (ref 42–98)
GFR SERPL CREATININE-BSD FRML MDRD: 204 ML/MIN/1.73 (ref 42–98)
GFR SERPL CREATININE-BSD FRML MDRD: >150 ML/MIN/1.73 (ref 60–98)
GLOBULIN UR ELPH-MCNC: 2.5 GM/DL (ref 2.3–3.5)
GLOBULIN UR ELPH-MCNC: 2.5 GM/DL (ref 2.3–3.5)
GLOBULIN UR ELPH-MCNC: 2.6 GM/DL (ref 2.3–3.5)
GLOBULIN UR ELPH-MCNC: 2.7 GM/DL (ref 2.3–3.5)
GLOBULIN UR ELPH-MCNC: 2.9 GM/DL (ref 2.3–3.5)
GLOBULIN UR ELPH-MCNC: 2.9 GM/DL (ref 2.3–3.5)
GLOBULIN UR ELPH-MCNC: 3 GM/DL (ref 2.5–4.6)
GLUCOSE BLD-MCNC: 101 MG/DL (ref 60–100)
GLUCOSE BLD-MCNC: 111 MG/DL (ref 60–100)
GLUCOSE BLD-MCNC: 113 MG/DL (ref 60–100)
GLUCOSE BLD-MCNC: 123 MG/DL (ref 70–100)
GLUCOSE BLD-MCNC: 142 MG/DL (ref 60–100)
GLUCOSE BLD-MCNC: 158 MG/DL (ref 60–100)
GLUCOSE BLD-MCNC: 64 MG/DL (ref 60–100)
GLUCOSE BLD-MCNC: 65 MG/DL (ref 60–100)
GLUCOSE BLD-MCNC: 69 MG/DL (ref 60–100)
GLUCOSE BLD-MCNC: 75 MG/DL (ref 60–100)
GLUCOSE BLD-MCNC: 82 MG/DL (ref 60–100)
GLUCOSE BLD-MCNC: 96 MG/DL (ref 60–100)
GLUCOSE BLDC GLUCOMTR-MCNC: 103 MG/DL (ref 70–130)
GLUCOSE BLDC GLUCOMTR-MCNC: 106 MG/DL (ref 70–130)
GLUCOSE BLDC GLUCOMTR-MCNC: 108 MG/DL (ref 70–130)
GLUCOSE BLDC GLUCOMTR-MCNC: 110 MG/DL (ref 70–130)
GLUCOSE BLDC GLUCOMTR-MCNC: 113 MG/DL (ref 70–130)
GLUCOSE BLDC GLUCOMTR-MCNC: 120 MG/DL (ref 70–130)
GLUCOSE BLDC GLUCOMTR-MCNC: 137 MG/DL (ref 70–130)
GLUCOSE BLDC GLUCOMTR-MCNC: 138 MG/DL (ref 70–130)
GLUCOSE BLDC GLUCOMTR-MCNC: 145 MG/DL (ref 70–130)
GLUCOSE BLDC GLUCOMTR-MCNC: 151 MG/DL (ref 70–130)
GLUCOSE BLDC GLUCOMTR-MCNC: 153 MG/DL (ref 70–130)
GLUCOSE BLDC GLUCOMTR-MCNC: 154 MG/DL (ref 70–130)
GLUCOSE BLDC GLUCOMTR-MCNC: 161 MG/DL (ref 70–130)
GLUCOSE BLDC GLUCOMTR-MCNC: 168 MG/DL (ref 70–130)
GLUCOSE BLDC GLUCOMTR-MCNC: 174 MG/DL (ref 70–130)
GLUCOSE BLDC GLUCOMTR-MCNC: 178 MG/DL (ref 70–130)
GLUCOSE BLDC GLUCOMTR-MCNC: 178 MG/DL (ref 70–130)
GLUCOSE BLDC GLUCOMTR-MCNC: 183 MG/DL (ref 70–130)
GLUCOSE BLDC GLUCOMTR-MCNC: 185 MG/DL (ref 70–130)
GLUCOSE BLDC GLUCOMTR-MCNC: 186 MG/DL (ref 70–130)
GLUCOSE BLDC GLUCOMTR-MCNC: 194 MG/DL (ref 70–130)
GLUCOSE BLDC GLUCOMTR-MCNC: 195 MG/DL (ref 70–130)
GLUCOSE BLDC GLUCOMTR-MCNC: 200 MG/DL (ref 70–130)
GLUCOSE BLDC GLUCOMTR-MCNC: 204 MG/DL (ref 70–130)
GLUCOSE BLDC GLUCOMTR-MCNC: 207 MG/DL (ref 70–130)
GLUCOSE BLDC GLUCOMTR-MCNC: 212 MG/DL (ref 70–130)
GLUCOSE BLDC GLUCOMTR-MCNC: 218 MG/DL (ref 70–130)
GLUCOSE BLDC GLUCOMTR-MCNC: 219 MG/DL (ref 70–130)
GLUCOSE BLDC GLUCOMTR-MCNC: 219 MG/DL (ref 70–130)
GLUCOSE BLDC GLUCOMTR-MCNC: 220 MG/DL (ref 70–130)
GLUCOSE BLDC GLUCOMTR-MCNC: 227 MG/DL (ref 70–130)
GLUCOSE BLDC GLUCOMTR-MCNC: 228 MG/DL (ref 70–130)
GLUCOSE BLDC GLUCOMTR-MCNC: 229 MG/DL (ref 70–130)
GLUCOSE BLDC GLUCOMTR-MCNC: 232 MG/DL (ref 70–130)
GLUCOSE BLDC GLUCOMTR-MCNC: 233 MG/DL (ref 70–130)
GLUCOSE BLDC GLUCOMTR-MCNC: 235 MG/DL (ref 70–130)
GLUCOSE BLDC GLUCOMTR-MCNC: 237 MG/DL (ref 70–130)
GLUCOSE BLDC GLUCOMTR-MCNC: 239 MG/DL (ref 70–130)
GLUCOSE BLDC GLUCOMTR-MCNC: 240 MG/DL (ref 70–130)
GLUCOSE BLDC GLUCOMTR-MCNC: 241 MG/DL (ref 70–130)
GLUCOSE BLDC GLUCOMTR-MCNC: 242 MG/DL (ref 70–130)
GLUCOSE BLDC GLUCOMTR-MCNC: 242 MG/DL (ref 70–130)
GLUCOSE BLDC GLUCOMTR-MCNC: 243 MG/DL (ref 70–130)
GLUCOSE BLDC GLUCOMTR-MCNC: 244 MG/DL (ref 70–130)
GLUCOSE BLDC GLUCOMTR-MCNC: 246 MG/DL (ref 70–130)
GLUCOSE BLDC GLUCOMTR-MCNC: 246 MG/DL (ref 70–130)
GLUCOSE BLDC GLUCOMTR-MCNC: 251 MG/DL (ref 70–130)
GLUCOSE BLDC GLUCOMTR-MCNC: 251 MG/DL (ref 70–130)
GLUCOSE BLDC GLUCOMTR-MCNC: 257 MG/DL (ref 70–130)
GLUCOSE BLDC GLUCOMTR-MCNC: 262 MG/DL (ref 70–130)
GLUCOSE BLDC GLUCOMTR-MCNC: 263 MG/DL (ref 70–130)
GLUCOSE BLDC GLUCOMTR-MCNC: 264 MG/DL (ref 70–130)
GLUCOSE BLDC GLUCOMTR-MCNC: 267 MG/DL (ref 70–130)
GLUCOSE BLDC GLUCOMTR-MCNC: 272 MG/DL (ref 70–130)
GLUCOSE BLDC GLUCOMTR-MCNC: 276 MG/DL (ref 70–130)
GLUCOSE BLDC GLUCOMTR-MCNC: 286 MG/DL (ref 70–130)
GLUCOSE BLDC GLUCOMTR-MCNC: 291 MG/DL (ref 70–130)
GLUCOSE BLDC GLUCOMTR-MCNC: 293 MG/DL (ref 70–130)
GLUCOSE BLDC GLUCOMTR-MCNC: 294 MG/DL (ref 70–130)
GLUCOSE BLDC GLUCOMTR-MCNC: 295 MG/DL (ref 70–130)
GLUCOSE BLDC GLUCOMTR-MCNC: 298 MG/DL (ref 70–130)
GLUCOSE BLDC GLUCOMTR-MCNC: 310 MG/DL (ref 70–130)
GLUCOSE BLDC GLUCOMTR-MCNC: 325 MG/DL (ref 70–130)
GLUCOSE BLDC GLUCOMTR-MCNC: 330 MG/DL (ref 70–130)
GLUCOSE BLDC GLUCOMTR-MCNC: 331 MG/DL (ref 70–130)
GLUCOSE BLDC GLUCOMTR-MCNC: 334 MG/DL (ref 70–130)
GLUCOSE BLDC GLUCOMTR-MCNC: 339 MG/DL (ref 70–130)
GLUCOSE BLDC GLUCOMTR-MCNC: 50 MG/DL (ref 70–130)
GLUCOSE BLDC GLUCOMTR-MCNC: 58 MG/DL (ref 70–130)
GLUCOSE BLDC GLUCOMTR-MCNC: 63 MG/DL (ref 70–130)
GLUCOSE BLDC GLUCOMTR-MCNC: 66 MG/DL (ref 70–130)
GLUCOSE BLDC GLUCOMTR-MCNC: 67 MG/DL (ref 70–130)
GLUCOSE BLDC GLUCOMTR-MCNC: 72 MG/DL (ref 70–130)
GLUCOSE BLDC GLUCOMTR-MCNC: 75 MG/DL (ref 70–130)
GLUCOSE BLDC GLUCOMTR-MCNC: 75 MG/DL (ref 70–130)
GLUCOSE BLDC GLUCOMTR-MCNC: 84 MG/DL (ref 70–130)
GLUCOSE BLDC GLUCOMTR-MCNC: 85 MG/DL (ref 70–130)
GLUCOSE BLDC GLUCOMTR-MCNC: 88 MG/DL (ref 70–130)
GLUCOSE BLDC GLUCOMTR-MCNC: 91 MG/DL (ref 70–130)
GLUCOSE BLDC GLUCOMTR-MCNC: 98 MG/DL (ref 70–130)
GRAM STN SPEC: NORMAL
HBA1C MFR BLD: 6.1 % (ref 4–5.6)
HCT VFR BLD AUTO: 31 % (ref 39–49)
HCT VFR BLD AUTO: 31.7 % (ref 39–49)
HCT VFR BLD AUTO: 32.3 % (ref 39–49)
HCT VFR BLD AUTO: 32.8 % (ref 39–49)
HCT VFR BLD AUTO: 33.7 % (ref 39–49)
HCT VFR BLD AUTO: 33.7 % (ref 39–49)
HCT VFR BLD AUTO: 33.8 % (ref 39–49)
HCT VFR BLD AUTO: 34 % (ref 39–49)
HCT VFR BLD AUTO: 34.5 % (ref 39–49)
HCT VFR BLD AUTO: 34.5 % (ref 39–49)
HCT VFR BLD AUTO: 35.9 % (ref 39–49)
HCT VFR BLD AUTO: 36 % (ref 39–49)
HCT VFR BLD AUTO: 37 % (ref 39–49)
HCT VFR BLD AUTO: 39.6 % (ref 39–49)
HCT VFR BLD AUTO: 40.3 % (ref 39–49)
HDLC SERPL-MCNC: 36 MG/DL (ref 35–100)
HEMOCCULT STL QL: POSITIVE
HGB BLD-MCNC: 10.3 G/DL (ref 13.7–17.3)
HGB BLD-MCNC: 10.4 G/DL (ref 13.7–17.3)
HGB BLD-MCNC: 10.5 G/DL (ref 13.7–17.3)
HGB BLD-MCNC: 10.5 G/DL (ref 13.7–17.3)
HGB BLD-MCNC: 10.7 G/DL (ref 13.7–17.3)
HGB BLD-MCNC: 11.1 G/DL (ref 13.7–17.3)
HGB BLD-MCNC: 11.1 G/DL (ref 13.7–17.3)
HGB BLD-MCNC: 11.2 G/DL (ref 13.7–17.3)
HGB BLD-MCNC: 11.4 G/DL (ref 13.7–17.3)
HGB BLD-MCNC: 11.5 G/DL (ref 13.7–17.3)
HGB BLD-MCNC: 11.9 G/DL (ref 13.7–17.3)
HGB BLD-MCNC: 12.1 G/DL (ref 13.7–17.3)
HGB BLD-MCNC: 12.3 G/DL (ref 13.7–17.3)
HGB BLD-MCNC: 13.2 G/DL (ref 13.7–17.3)
HGB BLD-MCNC: 13.7 G/DL (ref 13.7–17.3)
HOLD SPECIMEN: NORMAL
IMM GRANULOCYTES # BLD: 0.03 10*3/MM3 (ref 0–0.02)
IMM GRANULOCYTES NFR BLD: 0.9 % (ref 0–0.5)
LDLC SERPL CALC-MCNC: 44 MG/DL
LDLC/HDLC SERPL: 1.22 {RATIO}
LYMPHOCYTES # BLD AUTO: 0.29 10*3/MM3 (ref 0.6–4.2)
LYMPHOCYTES # BLD MANUAL: 1.44 10*3/MM3 (ref 0.6–4.2)
LYMPHOCYTES NFR BLD AUTO: 8.4 % (ref 10–50)
LYMPHOCYTES NFR BLD MANUAL: 19 % (ref 10–50)
LYMPHOCYTES NFR BLD MANUAL: 7 % (ref 0–12)
MAGNESIUM SERPL-MCNC: 1.8 MG/DL (ref 1.6–2.3)
MAGNESIUM SERPL-MCNC: 1.9 MG/DL (ref 1.6–2.3)
MAGNESIUM SERPL-MCNC: 2 MG/DL (ref 1.6–2.3)
MAGNESIUM SERPL-MCNC: 2.2 MG/DL (ref 1.6–2.3)
MAGNESIUM SERPL-MCNC: 2.2 MG/DL (ref 1.6–2.3)
MAGNESIUM SERPL-MCNC: 2.2 MG/DL (ref 1.8–2.5)
MAXIMAL PREDICTED HEART RATE: 146 BPM
MCH RBC QN AUTO: 31.3 PG (ref 26.5–34)
MCH RBC QN AUTO: 31.4 PG (ref 26.5–34)
MCH RBC QN AUTO: 31.5 PG (ref 26.5–34)
MCH RBC QN AUTO: 31.6 PG (ref 26.5–34)
MCH RBC QN AUTO: 32.2 PG (ref 26.5–34)
MCH RBC QN AUTO: 32.3 PG (ref 26.5–34)
MCH RBC QN AUTO: 32.4 PG (ref 26.5–34)
MCH RBC QN AUTO: 32.5 PG (ref 26.5–34)
MCH RBC QN AUTO: 32.6 PG (ref 26.5–34)
MCH RBC QN AUTO: 33.2 PG (ref 26.5–34)
MCHC RBC AUTO-ENTMCNC: 31.1 G/DL (ref 31.5–36.3)
MCHC RBC AUTO-ENTMCNC: 32.2 G/DL (ref 31.5–36.3)
MCHC RBC AUTO-ENTMCNC: 32.6 G/DL (ref 31.5–36.3)
MCHC RBC AUTO-ENTMCNC: 32.9 G/DL (ref 31.5–36.3)
MCHC RBC AUTO-ENTMCNC: 32.9 G/DL (ref 31.5–36.3)
MCHC RBC AUTO-ENTMCNC: 33 G/DL (ref 31.5–36.3)
MCHC RBC AUTO-ENTMCNC: 33.1 G/DL (ref 31.5–36.3)
MCHC RBC AUTO-ENTMCNC: 33.2 G/DL (ref 31.5–36.3)
MCHC RBC AUTO-ENTMCNC: 33.2 G/DL (ref 31.5–36.3)
MCHC RBC AUTO-ENTMCNC: 33.3 G/DL (ref 31.5–36.3)
MCHC RBC AUTO-ENTMCNC: 33.6 G/DL (ref 31.5–36.3)
MCHC RBC AUTO-ENTMCNC: 34 G/DL (ref 31.5–36.3)
MCV RBC AUTO: 100 FL (ref 80–98)
MCV RBC AUTO: 100.6 FL (ref 80–98)
MCV RBC AUTO: 107 FL (ref 80–98)
MCV RBC AUTO: 92.4 FL (ref 80–98)
MCV RBC AUTO: 93.8 FL (ref 80–98)
MCV RBC AUTO: 94 FL (ref 80–98)
MCV RBC AUTO: 94.6 FL (ref 80–98)
MCV RBC AUTO: 95.5 FL (ref 80–98)
MCV RBC AUTO: 96 FL (ref 80–98)
MCV RBC AUTO: 97.2 FL (ref 80–98)
MCV RBC AUTO: 97.5 FL (ref 80–98)
MCV RBC AUTO: 98.5 FL (ref 80–98)
MONOCYTES # BLD AUTO: 0.2 10*3/MM3 (ref 0–0.9)
MONOCYTES # BLD AUTO: 0.53 10*3/MM3 (ref 0–0.9)
MONOCYTES NFR BLD AUTO: 5.8 % (ref 0–12)
NEUTROPHILS # BLD AUTO: 2.94 10*3/MM3 (ref 2–8.6)
NEUTROPHILS # BLD AUTO: 5.62 10*3/MM3 (ref 2–8.6)
NEUTROPHILS NFR BLD AUTO: 84.6 % (ref 37–80)
NEUTROPHILS NFR BLD MANUAL: 74 % (ref 37–80)
NRBC BLD MANUAL-RTO: 0 /100 WBC (ref 0–0)
PLATELET # BLD AUTO: 105 10*3/MM3 (ref 150–450)
PLATELET # BLD AUTO: 134 10*3/MM3 (ref 150–450)
PLATELET # BLD AUTO: 168 10*3/MM3 (ref 150–450)
PLATELET # BLD AUTO: 168 10*3/MM3 (ref 150–450)
PLATELET # BLD AUTO: 233 10*3/MM3 (ref 150–450)
PLATELET # BLD AUTO: 67 10*3/MM3 (ref 150–450)
PLATELET # BLD AUTO: 67 10*3/MM3 (ref 150–450)
PLATELET # BLD AUTO: 69 10*3/MM3 (ref 150–450)
PLATELET # BLD AUTO: 73 10*3/MM3 (ref 150–450)
PLATELET # BLD AUTO: 80 10*3/MM3 (ref 150–450)
PLATELET # BLD AUTO: 85 10*3/MM3 (ref 150–450)
PLATELET # BLD AUTO: 88 10*3/MM3 (ref 150–450)
PMV BLD AUTO: 10 FL (ref 8–12)
PMV BLD AUTO: 10 FL (ref 8–12)
PMV BLD AUTO: 10.2 FL (ref 8–12)
PMV BLD AUTO: 10.2 FL (ref 8–12)
PMV BLD AUTO: 10.3 FL (ref 8–12)
PMV BLD AUTO: 10.4 FL (ref 8–12)
PMV BLD AUTO: 10.5 FL (ref 8–12)
PMV BLD AUTO: 10.5 FL (ref 8–12)
PMV BLD AUTO: 10.6 FL (ref 8–12)
PMV BLD AUTO: 11.2 FL (ref 8–12)
PMV BLD AUTO: 9.8 FL (ref 8–12)
PMV BLD AUTO: 9.9 FL (ref 8–12)
POLYCHROMASIA BLD QL SMEAR: NORMAL
POTASSIUM BLD-SCNC: 3.5 MMOL/L (ref 3.5–5.1)
POTASSIUM BLD-SCNC: 3.6 MMOL/L (ref 3.5–5.1)
POTASSIUM BLD-SCNC: 3.7 MMOL/L (ref 3.5–5.1)
POTASSIUM BLD-SCNC: 3.8 MMOL/L (ref 3.5–5.1)
POTASSIUM BLD-SCNC: 3.9 MMOL/L (ref 3.4–5.4)
POTASSIUM BLD-SCNC: 3.9 MMOL/L (ref 3.5–5.1)
POTASSIUM BLD-SCNC: 3.9 MMOL/L (ref 3.5–5.1)
POTASSIUM BLD-SCNC: 4.2 MMOL/L (ref 3.5–5.1)
POTASSIUM BLD-SCNC: 4.2 MMOL/L (ref 3.5–5.1)
PROT SERPL-MCNC: 4.7 G/DL (ref 6.3–8.6)
PROT SERPL-MCNC: 4.7 G/DL (ref 6.3–8.6)
PROT SERPL-MCNC: 4.8 G/DL (ref 6.3–8.6)
PROT SERPL-MCNC: 4.9 G/DL (ref 6.3–8.6)
PROT SERPL-MCNC: 5 G/DL (ref 6.3–8.6)
PROT SERPL-MCNC: 5.3 G/DL (ref 6.3–8.6)
PROT SERPL-MCNC: 5.4 G/DL (ref 6.7–8.2)
PROT SERPL-MCNC: 5.6 G/DL (ref 6.3–8.6)
RBC # BLD AUTO: 3.16 10*6/MM3 (ref 4.37–5.74)
RBC # BLD AUTO: 3.19 10*6/MM3 (ref 4.37–5.74)
RBC # BLD AUTO: 3.21 10*6/MM3 (ref 4.37–5.74)
RBC # BLD AUTO: 3.28 10*6/MM3 (ref 4.37–5.74)
RBC # BLD AUTO: 3.32 10*6/MM3 (ref 4.37–5.74)
RBC # BLD AUTO: 3.42 10*6/MM3 (ref 4.37–5.74)
RBC # BLD AUTO: 3.51 10*6/MM3 (ref 4.37–5.74)
RBC # BLD AUTO: 3.54 10*6/MM3 (ref 4.37–5.74)
RBC # BLD AUTO: 3.83 10*6/MM3 (ref 4.37–5.74)
RBC # BLD AUTO: 3.91 10*6/MM3 (ref 4.37–5.74)
RBC # BLD AUTO: 4.22 10*6/MM3 (ref 4.37–5.74)
RBC # BLD AUTO: 4.36 10*6/MM3 (ref 4.37–5.74)
RBC MORPH BLD: NORMAL
SCAN SLIDE: NORMAL
SMALL PLATELETS BLD QL SMEAR: ADEQUATE
SMALL PLATELETS BLD QL SMEAR: NORMAL
SMALL PLATELETS BLD QL SMEAR: NORMAL
SODIUM BLD-SCNC: 133 MMOL/L (ref 137–145)
SODIUM BLD-SCNC: 134 MMOL/L (ref 137–145)
SODIUM BLD-SCNC: 135 MMOL/L (ref 137–145)
SODIUM BLD-SCNC: 136 MMOL/L (ref 134–146)
SODIUM BLD-SCNC: 137 MMOL/L (ref 137–145)
SODIUM BLD-SCNC: 137 MMOL/L (ref 137–145)
SODIUM BLD-SCNC: 138 MMOL/L (ref 137–145)
SODIUM BLD-SCNC: 140 MMOL/L (ref 137–145)
STRESS TARGET HR: 124 BPM
TRIGL SERPL-MCNC: 151 MG/DL (ref 35–160)
VIT B12 BLD-MCNC: >1000 PG/ML (ref 239–931)
VLDLC SERPL-MCNC: 30.2 MG/DL
WBC MORPH BLD: NORMAL
WBC NRBC COR # BLD: 3.02 10*3/MM3 (ref 3.2–9.8)
WBC NRBC COR # BLD: 3.24 10*3/MM3 (ref 3.2–9.8)
WBC NRBC COR # BLD: 3.32 10*3/MM3 (ref 3.2–9.8)
WBC NRBC COR # BLD: 3.47 10*3/MM3 (ref 3.2–9.8)
WBC NRBC COR # BLD: 3.49 10*3/MM3 (ref 3.2–9.8)
WBC NRBC COR # BLD: 3.6 10*3/MM3 (ref 3.2–9.8)
WBC NRBC COR # BLD: 3.91 10*3/MM3 (ref 3.2–9.8)
WBC NRBC COR # BLD: 4.14 10*3/MM3 (ref 3.2–9.8)
WBC NRBC COR # BLD: 4.6 10*3/MM3 (ref 3.2–9.8)
WBC NRBC COR # BLD: 4.83 10*3/MM3 (ref 3.2–9.8)
WBC NRBC COR # BLD: 5.06 10*3/MM3 (ref 3.2–9.8)
WBC NRBC COR # BLD: 7.59 10*3/MM3 (ref 3.2–9.8)
WHOLE BLOOD HOLD SPECIMEN: NORMAL

## 2018-01-01 PROCEDURE — 25010000002 METHYLPREDNISOLONE PER 40 MG: Performed by: NURSE PRACTITIONER

## 2018-01-01 PROCEDURE — 85025 COMPLETE CBC W/AUTO DIFF WBC: CPT | Performed by: INTERNAL MEDICINE

## 2018-01-01 PROCEDURE — 63710000001 PREDNISONE PER 1 MG: Performed by: NURSE PRACTITIONER

## 2018-01-01 PROCEDURE — 85014 HEMATOCRIT: CPT | Performed by: NURSE PRACTITIONER

## 2018-01-01 PROCEDURE — 82962 GLUCOSE BLOOD TEST: CPT

## 2018-01-01 PROCEDURE — G8997 SWALLOW GOAL STATUS: HCPCS | Performed by: SPEECH-LANGUAGE PATHOLOGIST

## 2018-01-01 PROCEDURE — 99232 SBSQ HOSP IP/OBS MODERATE 35: CPT | Performed by: INTERNAL MEDICINE

## 2018-01-01 PROCEDURE — 85027 COMPLETE CBC AUTOMATED: CPT | Performed by: INTERNAL MEDICINE

## 2018-01-01 PROCEDURE — 25010000002 DIGOXIN PER 500 MCG: Performed by: INTERNAL MEDICINE

## 2018-01-01 PROCEDURE — 80053 COMPREHEN METABOLIC PANEL: CPT | Performed by: INTERNAL MEDICINE

## 2018-01-01 PROCEDURE — 63710000001 INSULIN DETEMIR PER 5 UNITS: Performed by: NURSE PRACTITIONER

## 2018-01-01 PROCEDURE — 97530 THERAPEUTIC ACTIVITIES: CPT

## 2018-01-01 PROCEDURE — 92526 ORAL FUNCTION THERAPY: CPT | Performed by: SPEECH-LANGUAGE PATHOLOGIST

## 2018-01-01 PROCEDURE — 87040 BLOOD CULTURE FOR BACTERIA: CPT | Performed by: NURSE PRACTITIONER

## 2018-01-01 PROCEDURE — 97535 SELF CARE MNGMENT TRAINING: CPT

## 2018-01-01 PROCEDURE — 97166 OT EVAL MOD COMPLEX 45 MIN: CPT

## 2018-01-01 PROCEDURE — 80061 LIPID PANEL: CPT | Performed by: INTERNAL MEDICINE

## 2018-01-01 PROCEDURE — 83735 ASSAY OF MAGNESIUM: CPT | Performed by: INTERNAL MEDICINE

## 2018-01-01 PROCEDURE — G8996 SWALLOW CURRENT STATUS: HCPCS | Performed by: SPEECH-LANGUAGE PATHOLOGIST

## 2018-01-01 PROCEDURE — 71045 X-RAY EXAM CHEST 1 VIEW: CPT

## 2018-01-01 PROCEDURE — 97110 THERAPEUTIC EXERCISES: CPT

## 2018-01-01 PROCEDURE — 83735 ASSAY OF MAGNESIUM: CPT | Performed by: NURSE PRACTITIONER

## 2018-01-01 PROCEDURE — 97162 PT EVAL MOD COMPLEX 30 MIN: CPT

## 2018-01-01 PROCEDURE — 80053 COMPREHEN METABOLIC PANEL: CPT | Performed by: NURSE PRACTITIONER

## 2018-01-01 PROCEDURE — 25010000002 ALTEPLASE 2 MG RECONSTITUTED SOLUTION: Performed by: INTERNAL MEDICINE

## 2018-01-01 PROCEDURE — 85007 BL SMEAR W/DIFF WBC COUNT: CPT | Performed by: INTERNAL MEDICINE

## 2018-01-01 PROCEDURE — 36415 COLL VENOUS BLD VENIPUNCTURE: CPT | Performed by: INTERNAL MEDICINE

## 2018-01-01 PROCEDURE — 93005 ELECTROCARDIOGRAM TRACING: CPT | Performed by: NURSE PRACTITIONER

## 2018-01-01 PROCEDURE — 85018 HEMOGLOBIN: CPT | Performed by: NURSE PRACTITIONER

## 2018-01-01 PROCEDURE — 83036 HEMOGLOBIN GLYCOSYLATED A1C: CPT | Performed by: INTERNAL MEDICINE

## 2018-01-01 PROCEDURE — 85025 COMPLETE CBC W/AUTO DIFF WBC: CPT | Performed by: NURSE PRACTITIONER

## 2018-01-01 PROCEDURE — 87205 SMEAR GRAM STAIN: CPT | Performed by: NURSE PRACTITIONER

## 2018-01-01 PROCEDURE — 85027 COMPLETE CBC AUTOMATED: CPT | Performed by: NURSE PRACTITIONER

## 2018-01-01 PROCEDURE — 93005 ELECTROCARDIOGRAM TRACING: CPT | Performed by: INTERNAL MEDICINE

## 2018-01-01 PROCEDURE — 82607 VITAMIN B-12: CPT | Performed by: INTERNAL MEDICINE

## 2018-01-01 PROCEDURE — 92610 EVALUATE SWALLOWING FUNCTION: CPT | Performed by: SPEECH-LANGUAGE PATHOLOGIST

## 2018-01-01 PROCEDURE — 63710000001 INSULIN DETEMIR 100 UNIT/ML SOLUTION: Performed by: NURSE PRACTITIONER

## 2018-01-01 PROCEDURE — 93306 TTE W/DOPPLER COMPLETE: CPT

## 2018-01-01 PROCEDURE — 82272 OCCULT BLD FECES 1-3 TESTS: CPT | Performed by: NURSE PRACTITIONER

## 2018-01-01 PROCEDURE — 85007 BL SMEAR W/DIFF WBC COUNT: CPT | Performed by: NURSE PRACTITIONER

## 2018-01-01 PROCEDURE — 93010 ELECTROCARDIOGRAM REPORT: CPT | Performed by: INTERNAL MEDICINE

## 2018-01-01 PROCEDURE — 87070 CULTURE OTHR SPECIMN AEROBIC: CPT | Performed by: NURSE PRACTITIONER

## 2018-01-01 RX ORDER — PROPAFENONE HYDROCHLORIDE 150 MG/1
150 TABLET, COATED ORAL EVERY 8 HOURS SCHEDULED
Status: DISCONTINUED | OUTPATIENT
Start: 2018-01-01 | End: 2018-01-01

## 2018-01-01 RX ORDER — DEXTROSE MONOHYDRATE 25 G/50ML
25 INJECTION, SOLUTION INTRAVENOUS
Status: DISCONTINUED | OUTPATIENT
Start: 2018-01-01 | End: 2018-01-01 | Stop reason: HOSPADM

## 2018-01-01 RX ORDER — NICOTINE POLACRILEX 4 MG
15 LOZENGE BUCCAL
Status: DISCONTINUED | OUTPATIENT
Start: 2018-01-01 | End: 2018-01-01 | Stop reason: HOSPADM

## 2018-01-01 RX ORDER — LANOLIN ALCOHOL/MO/W.PET/CERES
1000 CREAM (GRAM) TOPICAL DAILY
Status: DISCONTINUED | OUTPATIENT
Start: 2018-01-01 | End: 2018-01-01 | Stop reason: HOSPADM

## 2018-01-01 RX ORDER — MIDODRINE HYDROCHLORIDE 5 MG/1
10 TABLET ORAL
Status: DISCONTINUED | OUTPATIENT
Start: 2018-01-01 | End: 2018-01-01 | Stop reason: HOSPADM

## 2018-01-01 RX ORDER — GUAIFENESIN DEXTROMETHORPHAN HYDROBROMIDE ORAL SOLUTION 10; 100 MG/5ML; MG/5ML
5 SOLUTION ORAL EVERY 6 HOURS PRN
Status: DISCONTINUED | OUTPATIENT
Start: 2018-01-01 | End: 2018-01-01 | Stop reason: HOSPADM

## 2018-01-01 RX ORDER — METHYLPREDNISOLONE SODIUM SUCCINATE 40 MG/ML
40 INJECTION, POWDER, LYOPHILIZED, FOR SOLUTION INTRAMUSCULAR; INTRAVENOUS EVERY 12 HOURS
Status: DISCONTINUED | OUTPATIENT
Start: 2018-01-01 | End: 2018-01-01

## 2018-01-01 RX ORDER — DIGOXIN 250 MCG
250 TABLET ORAL
Status: DISCONTINUED | OUTPATIENT
Start: 2018-01-01 | End: 2018-01-01 | Stop reason: HOSPADM

## 2018-01-01 RX ORDER — ACETAMINOPHEN 325 MG/1
650 TABLET ORAL EVERY 6 HOURS PRN
Status: DISCONTINUED | OUTPATIENT
Start: 2018-01-01 | End: 2018-01-01 | Stop reason: HOSPADM

## 2018-01-01 RX ORDER — PANTOPRAZOLE SODIUM 40 MG/1
40 TABLET, DELAYED RELEASE ORAL
Status: DISCONTINUED | OUTPATIENT
Start: 2018-01-01 | End: 2018-01-01 | Stop reason: HOSPADM

## 2018-01-01 RX ORDER — IPRATROPIUM BROMIDE AND ALBUTEROL SULFATE 2.5; .5 MG/3ML; MG/3ML
3 SOLUTION RESPIRATORY (INHALATION)
Status: DISCONTINUED | OUTPATIENT
Start: 2018-01-01 | End: 2018-01-01 | Stop reason: HOSPADM

## 2018-01-01 RX ORDER — ATORVASTATIN CALCIUM 10 MG/1
10 TABLET, FILM COATED ORAL NIGHTLY
Status: DISCONTINUED | OUTPATIENT
Start: 2018-01-01 | End: 2018-01-01 | Stop reason: HOSPADM

## 2018-01-01 RX ORDER — ONDANSETRON 2 MG/ML
4 INJECTION INTRAMUSCULAR; INTRAVENOUS EVERY 6 HOURS PRN
Status: DISCONTINUED | OUTPATIENT
Start: 2018-01-01 | End: 2018-01-01 | Stop reason: HOSPADM

## 2018-01-01 RX ORDER — LACTULOSE 10 G/15ML
20 SOLUTION ORAL DAILY PRN
Status: DISCONTINUED | OUTPATIENT
Start: 2018-01-01 | End: 2018-01-01 | Stop reason: HOSPADM

## 2018-01-01 RX ORDER — BISACODYL 10 MG
10 SUPPOSITORY, RECTAL RECTAL DAILY
Status: DISCONTINUED | OUTPATIENT
Start: 2018-01-01 | End: 2018-01-01 | Stop reason: HOSPADM

## 2018-01-01 RX ORDER — PREDNISONE 20 MG/1
40 TABLET ORAL
Status: DISCONTINUED | OUTPATIENT
Start: 2018-01-01 | End: 2018-01-01 | Stop reason: SDUPTHER

## 2018-01-01 RX ORDER — ZOLPIDEM TARTRATE 5 MG/1
5 TABLET ORAL NIGHTLY PRN
Status: DISPENSED | OUTPATIENT
Start: 2018-01-01 | End: 2018-01-01

## 2018-01-01 RX ORDER — SODIUM CHLORIDE 0.9 % (FLUSH) 0.9 %
10 SYRINGE (ML) INJECTION EVERY 8 HOURS SCHEDULED
Status: DISCONTINUED | OUTPATIENT
Start: 2018-01-01 | End: 2018-01-01 | Stop reason: HOSPADM

## 2018-01-01 RX ORDER — BUDESONIDE 0.5 MG/2ML
0.5 INHALANT ORAL
Status: DISCONTINUED | OUTPATIENT
Start: 2018-01-01 | End: 2018-01-01 | Stop reason: ALTCHOICE

## 2018-01-01 RX ORDER — DILTIAZEM HYDROCHLORIDE 5 MG/ML
10 INJECTION INTRAVENOUS EVERY 4 HOURS PRN
Status: DISCONTINUED | OUTPATIENT
Start: 2018-01-01 | End: 2018-01-01 | Stop reason: HOSPADM

## 2018-01-01 RX ORDER — ALPRAZOLAM 0.5 MG/1
0.5 TABLET ORAL 2 TIMES DAILY PRN
Status: DISPENSED | OUTPATIENT
Start: 2018-01-01 | End: 2018-01-01

## 2018-01-01 RX ORDER — DEXTROSE MONOHYDRATE 25 G/50ML
INJECTION, SOLUTION INTRAVENOUS
Status: DISPENSED
Start: 2018-01-01 | End: 2018-01-01

## 2018-01-01 RX ORDER — SODIUM CHLORIDE 0.9 % (FLUSH) 0.9 %
3 SYRINGE (ML) INJECTION EVERY 12 HOURS SCHEDULED
Status: DISCONTINUED | OUTPATIENT
Start: 2018-01-01 | End: 2018-01-01 | Stop reason: HOSPADM

## 2018-01-01 RX ORDER — SODIUM CHLORIDE 9 MG/ML
INJECTION, SOLUTION INTRAVENOUS
Status: DISPENSED
Start: 2018-01-01 | End: 2018-01-01

## 2018-01-01 RX ORDER — SODIUM CHLORIDE 0.9 % (FLUSH) 0.9 %
3 SYRINGE (ML) INJECTION AS NEEDED
Status: DISCONTINUED | OUTPATIENT
Start: 2018-01-01 | End: 2018-01-01 | Stop reason: HOSPADM

## 2018-01-01 RX ORDER — BUDESONIDE AND FORMOTEROL FUMARATE DIHYDRATE 160; 4.5 UG/1; UG/1
2 AEROSOL RESPIRATORY (INHALATION)
Status: DISCONTINUED | OUTPATIENT
Start: 2018-01-01 | End: 2018-01-01 | Stop reason: HOSPADM

## 2018-01-01 RX ORDER — ALPRAZOLAM 0.5 MG/1
0.5 TABLET ORAL 2 TIMES DAILY PRN
Status: DISCONTINUED | OUTPATIENT
Start: 2018-01-01 | End: 2018-01-01 | Stop reason: HOSPADM

## 2018-01-01 RX ORDER — CALCIUM CARBONATE 200(500)MG
1 TABLET,CHEWABLE ORAL EVERY 6 HOURS PRN
Status: DISCONTINUED | OUTPATIENT
Start: 2018-01-01 | End: 2018-01-01 | Stop reason: HOSPADM

## 2018-01-01 RX ORDER — BUDESONIDE AND FORMOTEROL FUMARATE DIHYDRATE 80; 4.5 UG/1; UG/1
2 AEROSOL RESPIRATORY (INHALATION)
Status: DISCONTINUED | OUTPATIENT
Start: 2018-01-01 | End: 2018-01-01

## 2018-01-01 RX ORDER — PREDNISONE 20 MG/1
20 TABLET ORAL
Status: DISCONTINUED | OUTPATIENT
Start: 2018-01-01 | End: 2018-01-01 | Stop reason: HOSPADM

## 2018-01-01 RX ORDER — DILTIAZEM HYDROCHLORIDE 5 MG/ML
10 INJECTION INTRAVENOUS EVERY 6 HOURS PRN
Status: DISCONTINUED | OUTPATIENT
Start: 2018-01-01 | End: 2018-01-01

## 2018-01-01 RX ORDER — DILTIAZEM HYDROCHLORIDE 60 MG/1
60 TABLET, FILM COATED ORAL EVERY 6 HOURS SCHEDULED
Status: DISCONTINUED | OUTPATIENT
Start: 2018-01-01 | End: 2018-01-01 | Stop reason: HOSPADM

## 2018-01-01 RX ORDER — DIGOXIN 0.25 MG/ML
250 INJECTION INTRAMUSCULAR; INTRAVENOUS
Status: DISPENSED | OUTPATIENT
Start: 2018-01-01 | End: 2018-01-01

## 2018-01-01 RX ORDER — SODIUM CHLORIDE 0.9 % (FLUSH) 0.9 %
10 SYRINGE (ML) INJECTION AS NEEDED
Status: DISCONTINUED | OUTPATIENT
Start: 2018-01-01 | End: 2018-01-01 | Stop reason: HOSPADM

## 2018-02-10 NOTE — PROGRESS NOTES
CHIEF COMPLAINT:    Chief Complaint   Patient presents with   • Follow-up     Endo results.       HISTORY OF PRESENT ILLNESS:    Dru Brownlee is a 72 y.o. male who underwent Recent colonoscopy with polypectomy.  Multiple polyps removed.  2 of which were hyperplastic polyps in the sigmoid colon and descending colon.  He had a tubular adenoma removed from the transverse colon area these results were discussed with him today.  He has no complaints following his colonoscopy.    EXAM:  There were no vitals filed for this visit.      Abdomen soft    ASSESSMENT:    Adenomatous polyp of transverse colon    PLAN:    His pathology was discussed with him today.  He was informed he will need a repeat colonoscopy for surveillance in 5 years.        This document has been electronically signed by Ken Garcia MD on February 10, 2018 5:18 PM

## 2018-03-15 NOTE — SIGNIFICANT NOTE
03/15/18 1520   Rehab Treatment   Discipline physical therapy assistant   Reason Treatment Not Performed patient/family declined treatment  (pt to discharge to SNF this date and deferred activity. )

## 2023-03-21 NOTE — SIGNIFICANT NOTE
03/15/18 1538   Rehab Treatment   Discipline occupational therapy assistant   Reason Treatment Not Performed patient/family declined treatment  (pt expected to d/c this date and declined tx prior to d/c)      Cellcept Pregnancy And Lactation Text: This medication is Pregnancy Category D and isn't considered safe during pregnancy. It is unknown if this medication is excreted in breast milk.

## (undated) DEVICE — GLV SURG SENSICARE GREEN W/ALOE PF LF 6.5 STRL

## (undated) DEVICE — URETERAL ACCESS SHEATH SET: Brand: NAVIGATOR HD

## (undated) DEVICE — SYS IRR PUMP SGL ACTN VAC SYR 10CC

## (undated) DEVICE — EVAC BLDR UROVAC W ADAPT

## (undated) DEVICE — CATH URETRL OPN/END 5F70CM

## (undated) DEVICE — GW PTFE FIX/CORE FLXTIP .038 3X150CM

## (undated) DEVICE — CANN SMPL SOFTECH BIFLO ETCO2 A/M 7FT

## (undated) DEVICE — SINGLE-USE BIOPSY FORCEPS: Brand: RADIAL JAW 4

## (undated) DEVICE — SOL IRR H2O BTL 1000ML STRL

## (undated) DEVICE — NITINOL WIRE WITH HYDROPHILIC TIP: Brand: SENSOR

## (undated) DEVICE — SOL IRRG H2O PL/BG 1000ML STRL

## (undated) DEVICE — PAD GRND REM POLYHESIVE A/ DISP

## (undated) DEVICE — CONTAINER,SPECIMEN,OR STERILE,4OZ: Brand: MEDLINE

## (undated) DEVICE — GLV SURG NEOLON 2G PF LF 6.5 STRL

## (undated) DEVICE — Device: Brand: DISPOSABLE ELECTROSURGICAL SNARE

## (undated) DEVICE — GLV SURG NEOLON 2G PF LF 7.5 STRL

## (undated) DEVICE — CATH URETRL OPEN END W/CONNECT 5F 70CM

## (undated) DEVICE — FIBR LASR FLEXIVA 200 HIPOWR 1P/U

## (undated) DEVICE — SOL IRR NACL 0.9PCT 3000ML

## (undated) DEVICE — Device

## (undated) DEVICE — ENDOSCOPIC SEAL URO 1 SIZE FITS ALL: Brand: ENDOSCOPIC SEAL

## (undated) DEVICE — PK CYSTO LF 60

## (undated) DEVICE — SOL PVPI SPRY BETADINE 3OZ

## (undated) DEVICE — TRAP SXN POLYP QUICKCATCH LF